# Patient Record
Sex: MALE | Race: WHITE | NOT HISPANIC OR LATINO | Employment: FULL TIME | URBAN - METROPOLITAN AREA
[De-identification: names, ages, dates, MRNs, and addresses within clinical notes are randomized per-mention and may not be internally consistent; named-entity substitution may affect disease eponyms.]

---

## 2018-01-10 NOTE — MISCELLANEOUS
Message   Recorded as Task   Date: 06/30/2016 09:50 AM, Created By: VITO RUBIO  [facility]   Task Name: Follow Up   Assigned To: [facility],Team   Regarding Patient: Kimberlyn Zabala, Status: Active   CommentNhi Wharton - 30 Jun 2016 9:50 AM     TASK CREATED    Routine post op call placed to patient  Excision sebaceous cyst right flank, left flank; right and left lower abdomen done on 6/29/16  Patient answered and had no questions or concerns  Verified post op appointment for 7/12/16 at 9:15am at the [facility] office  Patient verbalized understanding  Path pending  [facility] - 08 Jul 2016 4:13 PM     TASK EDITED  Called patient with results  Results showed it was negative for malignancy and benign skin and adipose tissue  Explained to patient it was negative for cancer and just confirmed it was a lipoma  Patient verbalized understanding and was happy with the results and appreciated the phone call  Verified post op appointment on 7/12/16  Active Problems    1  Acute sinusitis (461 9) (J01 90)   2  Dyspnea, unspecified type (786 09) (R06 00)   3  Lipoma (214 9) (D17 9)   4  Sebaceous cyst (706 2) (L72 3)    Current Meds   1  ZyrTEC Allergy CAPS; Therapy: (Recorded:15Jun2016) to Recorded    Allergies    1  No Known Drug Allergies    2   Seasonal    Signatures   Electronically signed by : Leilani Auguste, ; Jul 8 2016  4:14PM EST                       (Author)

## 2018-01-15 NOTE — PROGRESS NOTES
Assessment    1  Encounter for preventive health examination (V70 0) (Z00 00)   2  Lipoma (214 9) (D17 9)   3  Dyspnea, unspecified type (786 09) (R06 00)    Plan  Dyspnea, unspecified type, Lipoma    · * XR CHEST PA & LATERAL; Status:Active; Requested XZB:47IMH2179; Health Maintenance    · Follow-up visit in 1 year Evaluation and Treatment  Follow-up  Status: Hold For -  Scheduling  Requested for: 12YBG0065  Lipoma    · (1) CBC/PLT/DIFF; Status:Active; Requested RWS:[id number];    · (1) COMPREHENSIVE METABOLIC PANEL; Status:Active; Requested RQE:[id number];    · (1) HEMOGLOBIN A1C; Status:Active; Requested XYS:[id number];    · (1) LIPID PANEL FASTING W DIRECT LDL REFLEX; Status:Active; Requested  SZB:[id number];    · (1) T4, FREE; Status:Active; Requested LESLIE:59QHS8808;    · (1) TSH; Status:Active; Requested OIX:29BCC7198;    · (1) VITAMIN D 25-HYDROXY; Status:Active; Requested PAX:[id number];    · 1 - Fam Castellon MD  (General Surgery) Physician Referral  Consult  Status: Hold For  - Scheduling,Retrospective By Protocol Authorization  Requested for: 19OWB1139  Care Summary provided  : Yes    Discussion/Summary  Impression: health maintenance visit  Currently, he eats an adequate diet and has an adequate exercise regimen  Testicular cancer screening: the risks and benefits of testicular cancer screening were discussed  Screening lab work includes glucose, lipid profile and 25-hydroxyvitamin D  The risks and benefits of immunizations were discussed  He was advised to be evaluated by a dentist  Advice and education were given regarding nutrition, aerobic exercise, weight bearing exercise, weight loss, reproductive health, sunscreen use, self skin examination and seat belt use  Patient discussion: discussed with the patient  Rec  labs and cxray and f-up with General surgery for lipoma/lump under skin in right abdomen and b/l mid back   Monitor for increasing dyspnea with strenuous activity and call office if worse or not better  The patient was counseled regarding  Chief Complaint  Pt is here for new patient physical  PT states he has a lump on his right side of his abdomen also on the right side of his back Pt states its a little sore  PT stated it happened 3 months ago  PT also stated he has 2 other lumps on opposite side but it been there for a year  History of Present Illness  HPI: Pt is here for new patient physical  PT states he has a lump on his right side of his abdomen also on the right side of his back Pt states its a little sore  PT stated it happened 3 months ago  PT also stated he has 2 other lumps on opposite side but it been there for a year  Pt  has 3 sons, is  but has a girlfriend, he is a  also  Mother  from HIV complications and also has younger brother with HIV  Pt has 7 brothers  Father with cirrhosis and other medical conditions  Pt  also complains of some dyspnea only associated with strenuous activity like hiking  He was a 17 year smoker but quit  Pt  does [employer] but no cp or sob with this activity  Review of Systems    Constitutional: No fever or chills, feels well, no tiredness, no recent weight gain or weight loss  Eyes: No complaints of eye pain, no red eyes, no discharge from eyes, no itchy eyes  ENT: no complaints of earache, no hearing loss, no nosebleeds, no nasal discharge, no sore throat, no hoarseness  Cardiovascular: No complaints of slow heart rate, no fast heart rate, no chest pain, no palpitations, no leg claudication, no lower extremity  Respiratory: as noted in HPI  Gastrointestinal: No complaints of abdominal pain, no constipation, no nausea or vomiting, no diarrhea or bloody stools  Genitourinary: No complaints of dysuria, no incontinence, no hesitancy, no nocturia, no genital lesion, no testicular pain  Musculoskeletal: No complaints of arthralgia, no myalgias, no joint swelling or stiffness, no limb pain or swelling  Integumentary: as noted in HPI  Neurological: No compliants of headache, no confusion, no convulsions, no numbness or tingling, no dizziness or fainting, no limb weakness, no difficulty walking  Psychiatric: Is not suicidal, no sleep disturbances, no anxiety or depression, no change in personality, no emotional problems  Endocrine: No complaints of proptosis, no hot flashes, no muscle weakness, no erectile dysfunction, no deepening of the voice, no feelings of weakness  Hematologic/Lymphatic: No complaints of swollen glands, no swollen glands in the neck, does not bleed easily, no easy bruising  Active Problems    1  Acute sinusitis (461 9) (J01 90)    Family History  Mother    · Family history of   Father    · Family history of     Social History    · Alcohol use (V49 89) (Z78 9)   · Quit smoking (V15 82) (P67 124)    Current Meds   1  Amoxicillin-Pot Clavulanate 875-125 MG Oral Tablet; take 1 tab po BID x 7 days; Therapy: 15ACB4993 to (Last Rx:72Kze9876)  Requested for: 41Jbf1248 Ordered   2  [facility] Allergy CAPS; Therapy: (Recorded:2016) to Recorded    Allergies    1  No Known Drug Allergies    2  Seasonal    Vitals   Recorded: 49IVP8751 81:18BS   Systolic 481   Diastolic 80   Height 5 ft 8 5 in   Weight 221 lb 0 8 oz   BMI Calculated 33 12   BSA Calculated 2 14     Physical Exam    Constitutional   General appearance: No acute distress, well appearing and well nourished  Eyes   Conjunctiva and lids: No erythema, swelling or discharge  Pupils and irises: Equal, round, reactive to light  Ophthalmoscopic examination: Normal fundi and optic discs  Ears, Nose, Mouth, and Throat   External inspection of ears and nose: Normal     Otoscopic examination: Tympanic membranes translucent with normal light reflex  Canals patent without erythema  Hearing: Normal     Nasal mucosa, septum, and turbinates: Normal without edema or erythema      Lips, teeth, and gums: Normal, good dentition  Oropharynx: Normal with no erythema, edema, exudate or lesions  Neck   Neck: Supple, symmetric, trachea midline, no masses  Thyroid: Normal, no thyromegaly  Pulmonary   Respiratory effort: No increased work of breathing or signs of respiratory distress  Percussion of chest: Normal     Palpation of chest: Normal     Auscultation of lungs: Clear to auscultation  Cardiovascular   Palpation of heart: Normal PMI, no thrills  Auscultation of heart: Normal rate and rhythm, normal S1 and S2, no murmurs  Carotid pulses: 2+ bilaterally  Abdominal aorta: Normal     Femoral pulses: 2+ bilaterally  Pedal pulses: 2+ bilaterally  Examination of extremities for edema and/or varicosities: Normal     Chest   Breasts: Normal, no dimpling or skin changes appreciated  Palpation of breasts and axillae: Normal, no masses palpated  Chest: Normal     Abdomen   Abdomen: Non-tender, no masses  Liver and spleen: No hepatomegaly or splenomegaly  Examination for hernias: No hernias appreciated  Genitourinary   Scrotal contents: Normal testes, no masses  Penis: Normal, no lesions  Lymphatic   Palpation of lymph nodes in neck: No lymphadenopathy  Palpation of lymph nodes in axillae: No lymphadenopathy  Palpation of lymph nodes in groin: No lymphadenopathy  Palpation of lymph nodes in other areas: No lymphadenopathy  Musculoskeletal   Gait and station: Normal     Inspection/palpation of digits and nails: Normal without clubbing or cyanosis  Inspection/palpation of joints, bones, and muscles: Normal     Range of motion: Normal     Stability: Normal     Muscle strength/tone: Normal     Skin   Skin and subcutaneous tissue: Abnormal   right back right abdominal area and left back area lump/possible lipoma  Palpation of skin and subcutaneous tissue: Normal turgor  Neurologic   Cranial nerves: Cranial nerves 2-12 intact  Reflexes: 2+ and symmetric      Sensation: No sensory loss  Psychiatric   Judgment and insight: Normal     Orientation to person, place and time: Normal     Recent and remote memory: Intact      Mood and affect: Normal        Signatures   Electronically signed by : Ignacio Brambila DO; Tyrese 15 2016  3:00PM EST                       (Author)

## 2018-01-17 NOTE — MISCELLANEOUS
Message   Recorded as Task   Date: 06/15/2016 02:49 PM, Created By: System   Task Name: Schedule Appointment   Assigned To: [facility],Team   Regarding Patient: Verner Boyer, Status: In Progress   Comment:    System - 15 Tyrese 2016 2:49 PM        Shoshana Esteban - 15 Tyrese 2016 2:50 PM     TASK REASSIGNED: Previously Assigned To Jennifer Bledsoe  Please call patient to schedule an appointment within 48 hours on their home phone [phone]  The best time to reach the patient is in the day  Patient prefers appointment to be  or first available  LIPMOA ON ABD    Last Mammogram date:[  ]    After this appointment is scheduled please reply back to [  ] team    Alexi Munoz - 15 Tyrese 2016 3:38 PM     TASK IN PROGRESS   Kaylen Stafford - 16 Jun 2016 9:41 AM     TASK EDITED  lvm [phone]   Jodi Ray - 16 Jun 2016 11:13 AM     TASK EDITED  PT is scheduled for 6/24 @ 12:30pm with Dr Azar Sorto  pt needs a referral for this appointment  Leonie Hollingsworth - 16 Jun 2016 11:24 AM     TASK IN PROGRESS   Jodi Ray - 16 Jun 2016 11:35 AM     TASK EDITED   Jordyn Gaffney - 16 Jun 2016 11:37 AM     TASK REASSIGNED: Previously Assigned To CENTRAL SCHEDULING,SPECIALIST APPT TM  6/16/16 INSURANCE REFERRAL IS COMPLETED & PATIENT HAS BEEN NOTIFIED/MO2        Active Problems    1  Acute sinusitis (461 9) (J01 90)   2  Dyspnea, unspecified type (786 09) (R06 00)   3  Lipoma (214 9) (D17 9)    Current Meds   1  Amoxicillin-Pot Clavulanate 875-125 MG Oral Tablet (Augmentin); take 1 tab po BID x 7   days; Therapy: 84KMF4186 to (Last Rx:[id number])  Requested for: 44Vpb6597 Ordered   2  ZyrTEC Allergy CAPS; Therapy: (Recorded:15Jun2016) to Recorded    Allergies    1  No Known Drug Allergies    2   Seasonal    Signatures   Electronically signed by : Miracle Holden, ; Jun 16 2016 11:52AM EST                       (Author)

## 2018-01-18 NOTE — MISCELLANEOUS
Message  Return to work or school:   Katarina Jay is under my professional care   He was seen in my office on 02/12/2016             Signatures   Electronically signed by : GREYSON Mendez ; Feb 12 2016  2:15PM EST                       (Author)

## 2018-08-17 ENCOUNTER — OFFICE VISIT (OUTPATIENT)
Dept: FAMILY MEDICINE CLINIC | Facility: CLINIC | Age: 38
End: 2018-08-17
Payer: COMMERCIAL

## 2018-08-17 VITALS
SYSTOLIC BLOOD PRESSURE: 110 MMHG | WEIGHT: 219.4 LBS | BODY MASS INDEX: 32.5 KG/M2 | HEIGHT: 69 IN | DIASTOLIC BLOOD PRESSURE: 70 MMHG

## 2018-08-17 DIAGNOSIS — Z00.00 HEALTH CARE MAINTENANCE: Primary | ICD-10-CM

## 2018-08-17 DIAGNOSIS — Z13.220 SCREENING FOR HYPERLIPIDEMIA: ICD-10-CM

## 2018-08-17 DIAGNOSIS — E66.9 OBESITY (BMI 30-39.9): ICD-10-CM

## 2018-08-17 DIAGNOSIS — B35.4 TINEA CORPORIS: ICD-10-CM

## 2018-08-17 PROCEDURE — 99396 PREV VISIT EST AGE 40-64: CPT | Performed by: FAMILY MEDICINE

## 2018-08-17 RX ORDER — CLOTRIMAZOLE AND BETAMETHASONE DIPROPIONATE 10; .64 MG/G; MG/G
CREAM TOPICAL 2 TIMES DAILY
Qty: 30 G | Refills: 0 | Status: SHIPPED | OUTPATIENT
Start: 2018-08-17

## 2018-08-17 NOTE — PROGRESS NOTES
Assessment/Plan:  Chief Complaint   Patient presents with    Physical Exam     discuss tdap     Patient Instructions   Here for general PE and use Lotrisone as directed for rash and call if not better  Check complete labs and continue with diet and exercise to help lose weight  No problem-specific Assessment & Plan notes found for this encounter  Diagnoses and all orders for this visit:    Health care maintenance  -     Comprehensive metabolic panel; Future  -     CBC and differential; Future  -     Lipid Panel with Direct LDL reflex; Future    Tinea corporis  -     clotrimazole-betamethasone (LOTRISONE) 1-0 05 % cream; Apply topically 2 (two) times a day    Obesity (BMI 30-39  9)    Screening for hyperlipidemia  -     Comprehensive metabolic panel; Future  -     Lipid Panel with Direct LDL reflex; Future          Subjective:      Patient ID: Mee Goel is a 34 y o  male  Here for general PE and is  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  No cp or sob, or ha  Feels well  Stress stable  Quit tobacco 5 years ago  The following portions of the patient's history were reviewed and updated as appropriate: allergies, current medications, past family history, past medical history, past social history, past surgical history and problem list     Review of Systems   Constitutional:        Obesity     HENT: Negative  Eyes: Negative  Respiratory: Negative  Cardiovascular: Negative  Gastrointestinal: Negative  Endocrine: Negative  Genitourinary: Negative  Musculoskeletal: Negative  Skin: Negative  Rash on left hand and left foot    Allergic/Immunologic: Negative  Neurological: Negative  Hematological: Negative  Psychiatric/Behavioral: Negative            Objective:      /70   Ht 5' 8 5" (1 74 m)   Wt 99 5 kg (219 lb 6 4 oz)   BMI 32 87 kg/m²          Physical Exam   Constitutional: He is oriented to person, place, and time  He appears well-developed and well-nourished  obesity   HENT:   Head: Normocephalic and atraumatic  Right Ear: External ear normal    Left Ear: External ear normal    Nose: Nose normal    Mouth/Throat: Oropharynx is clear and moist    Eyes: Conjunctivae and EOM are normal  Pupils are equal, round, and reactive to light  Neck: Normal range of motion  Neck supple  Cardiovascular: Normal rate, regular rhythm, normal heart sounds and intact distal pulses  Pulmonary/Chest: Effort normal and breath sounds normal    Abdominal: Soft  Bowel sounds are normal    Genitourinary: Penis normal    Musculoskeletal: Normal range of motion  Neurological: He is alert and oriented to person, place, and time  He has normal reflexes  Skin: Skin is warm and dry  Rash left hand and left foot tinea corporis likely  Psychiatric: He has a normal mood and affect   His behavior is normal

## 2018-08-17 NOTE — PATIENT INSTRUCTIONS
Here for general PE and use Lotrisone as directed for rash and call if not better  Check complete labs and continue with diet and exercise to help lose weight

## 2018-12-20 ENCOUNTER — OFFICE VISIT (OUTPATIENT)
Dept: FAMILY MEDICINE CLINIC | Facility: CLINIC | Age: 38
End: 2018-12-20
Payer: COMMERCIAL

## 2018-12-20 VITALS
WEIGHT: 236.2 LBS | HEIGHT: 68 IN | DIASTOLIC BLOOD PRESSURE: 82 MMHG | BODY MASS INDEX: 35.8 KG/M2 | SYSTOLIC BLOOD PRESSURE: 126 MMHG

## 2018-12-20 DIAGNOSIS — E66.9 OBESITY (BMI 30-39.9): ICD-10-CM

## 2018-12-20 DIAGNOSIS — B35.4 TINEA CORPORIS: Primary | ICD-10-CM

## 2018-12-20 DIAGNOSIS — Z13.220 SCREENING FOR HYPERLIPIDEMIA: ICD-10-CM

## 2018-12-20 DIAGNOSIS — R21 RASH: ICD-10-CM

## 2018-12-20 PROCEDURE — 1036F TOBACCO NON-USER: CPT | Performed by: FAMILY MEDICINE

## 2018-12-20 PROCEDURE — 3008F BODY MASS INDEX DOCD: CPT | Performed by: FAMILY MEDICINE

## 2018-12-20 PROCEDURE — 99214 OFFICE O/P EST MOD 30 MIN: CPT | Performed by: FAMILY MEDICINE

## 2018-12-20 RX ORDER — CLOTRIMAZOLE AND BETAMETHASONE DIPROPIONATE 10; .64 MG/G; MG/G
CREAM TOPICAL 2 TIMES DAILY
Qty: 45 G | Refills: 1 | Status: SHIPPED | OUTPATIENT
Start: 2018-12-20

## 2018-12-20 RX ORDER — PREDNISONE 10 MG/1
TABLET ORAL
Qty: 21 TABLET | Refills: 0 | Status: SHIPPED | OUTPATIENT
Start: 2018-12-20 | End: 2020-09-03

## 2018-12-20 NOTE — PROGRESS NOTES
Assessment/Plan:  Chief Complaint   Patient presents with    Rash     L abdomen and L thigh is red, scaly, and extremely itchy  Using vaseline daily and cortisone 10      Patient Instructions   Start lotrisone as directed for rash left thigh and also left abdomen  Start prednisone 10 mg 6-5-4-3-2-1 and also use Zyrtec 10 mg daily prn rash  Wash hands  Call if any problems  No problem-specific Assessment & Plan notes found for this encounter  Diagnoses and all orders for this visit:    Tinea corporis  -     clotrimazole-betamethasone (LOTRISONE) 1-0 05 % cream; Apply topically 2 (two) times a day  -     Comprehensive metabolic panel; Future  -     CBC and differential; Future    Rash  -     clotrimazole-betamethasone (LOTRISONE) 1-0 05 % cream; Apply topically 2 (two) times a day  -     predniSONE 10 mg tablet; Take 60 mg po day#1, 50 mg po day#2, 40 mg po day#3, 30 mg po day#4, 20 mg po day#5m and 10 mg po day#6  -     Comprehensive metabolic panel; Future  -     CBC and differential; Future    Obesity (BMI 30-39 9)  -     Comprehensive metabolic panel; Future  -     CBC and differential; Future    Screening for hyperlipidemia  -     Comprehensive metabolic panel; Future  -     Lipid Panel with Direct LDL reflex; Future          Subjective:      Patient ID: Shasta Paiz is a 34 y o  male  Rash (L abdomen and L thigh is red, scaly, and extremely itchy  Using vaseline daily and cortisone 10 )   He is going on vacation soon  No other complaints  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  Also needs labs reprinted to be done  Rash         The following portions of the patient's history were reviewed and updated as appropriate: allergies, current medications, past family history, past medical history, past social history, past surgical history and problem list     Review of Systems   Constitutional: Negative  HENT: Negative  Eyes: Negative  Respiratory: Negative      Cardiovascular: Negative  Gastrointestinal: Negative  Endocrine: Negative  Genitourinary: Negative  Musculoskeletal: Negative  Skin: Positive for rash (left abdomen and left thigh for 2 weeks ago)  Allergic/Immunologic: Negative  Neurological: Negative  Hematological: Negative  Psychiatric/Behavioral: Negative  Objective:      /82   Ht 5' 8" (1 727 m)   Wt 107 kg (236 lb 3 2 oz)   BMI 35 91 kg/m²          Physical Exam   Constitutional: He is oriented to person, place, and time  He appears well-developed and well-nourished  obesity   HENT:   Head: Normocephalic and atraumatic  Right Ear: External ear normal    Left Ear: External ear normal    Nose: Nose normal    Mouth/Throat: Oropharynx is clear and moist    Eyes: Pupils are equal, round, and reactive to light  Conjunctivae and EOM are normal    Neck: Normal range of motion  Neck supple  Cardiovascular: Normal rate, regular rhythm, normal heart sounds and intact distal pulses  Pulmonary/Chest: Effort normal and breath sounds normal    Musculoskeletal: Normal range of motion  Neurological: He is alert and oriented to person, place, and time  He has normal reflexes  Skin: Skin is warm and dry    ;eft abdomen and left thigh rash   Psychiatric: He has a normal mood and affect   His behavior is normal

## 2018-12-20 NOTE — PATIENT INSTRUCTIONS
Start lotrisone as directed for rash left thigh and also left abdomen  Start prednisone 10 mg 6-5-4-3-2-1 and also use Zyrtec 10 mg daily prn rash  Wash hands  Call if any problems

## 2020-03-20 ENCOUNTER — OFFICE VISIT (OUTPATIENT)
Dept: FAMILY MEDICINE CLINIC | Facility: CLINIC | Age: 40
End: 2020-03-20
Payer: COMMERCIAL

## 2020-03-20 VITALS
OXYGEN SATURATION: 97 % | TEMPERATURE: 97.5 F | BODY MASS INDEX: 34.93 KG/M2 | SYSTOLIC BLOOD PRESSURE: 136 MMHG | HEART RATE: 65 BPM | WEIGHT: 235.8 LBS | RESPIRATION RATE: 17 BRPM | HEIGHT: 69 IN | DIASTOLIC BLOOD PRESSURE: 84 MMHG

## 2020-03-20 DIAGNOSIS — E66.9 OBESITY (BMI 30-39.9): ICD-10-CM

## 2020-03-20 DIAGNOSIS — Z13.220 SCREENING FOR HYPERLIPIDEMIA: ICD-10-CM

## 2020-03-20 DIAGNOSIS — Z00.00 HEALTH CARE MAINTENANCE: Primary | ICD-10-CM

## 2020-03-20 PROCEDURE — 99396 PREV VISIT EST AGE 40-64: CPT | Performed by: FAMILY MEDICINE

## 2020-03-20 NOTE — PROGRESS NOTES
BMI Counseling: Body mass index is 35 13 kg/m²  The BMI is above normal  Nutrition recommendations include reducing portion sizes, decreasing overall calorie intake, 3-5 servings of fruits/vegetables daily, reducing fast food intake, consuming healthier snacks and reducing intake of cholesterol  Exercise recommendations include exercising 3-5 times per week

## 2020-03-20 NOTE — PATIENT INSTRUCTIONS
Here for general PE and will need to have labs done and lose weight  Recheck yearly for general PE  Preventive medicine discussed  F-up with dentist as directed  Doing Kendrick Mchugh Do

## 2020-03-20 NOTE — PROGRESS NOTES
Assessment/Plan:  Chief Complaint   Patient presents with    Physical Exam     Annual  Pt c/o on going dizziness and nausea  Pt also has trouble concentrating  Patient Instructions   Here for general PE and will need to have labs done and lose weight  Recheck yearly for general PE  No problem-specific Assessment & Plan notes found for this encounter  Diagnoses and all orders for this visit:    Health care maintenance  -     Comprehensive metabolic panel; Future  -     CBC and differential; Future  -     Lipid Panel with Direct LDL reflex; Future  -     TSH, 3rd generation; Future  -     T4, free    Obesity (BMI 30-39 9)  -     Comprehensive metabolic panel; Future  -     CBC and differential; Future  -     Lipid Panel with Direct LDL reflex; Future  -     TSH, 3rd generation; Future  -     T4, free    Screening for hyperlipidemia  -     Comprehensive metabolic panel; Future  -     CBC and differential; Future  -     Lipid Panel with Direct LDL reflex; Future  -     TSH, 3rd generation; Future  -     T4, free          Subjective:      Patient ID: Abdias Melvin is a 37 y o  male  Physical Exam (Annual  Pt c/o on going dizziness and nausea  Pt also has trouble concentrating  )      The following portions of the patient's history were reviewed and updated as appropriate: allergies, current medications, past family history, past medical history, past social history, past surgical history and problem list     Review of Systems   Constitutional:        Obesity   HENT: Negative  Eyes: Negative  Respiratory: Negative  Cardiovascular: Negative  Gastrointestinal: Negative  Endocrine: Negative  Genitourinary: Negative  Musculoskeletal: Negative  Skin: Negative  Allergic/Immunologic: Negative  Neurological: Negative  Hematological: Negative  Psychiatric/Behavioral: Negative            Objective:      /84 (BP Location: Left arm, Patient Position: Sitting, Cuff Size: Large)   Pulse 65   Temp 97 5 °F (36 4 °C) (Temporal)   Resp 17   Ht 5' 8 7" (1 745 m)   Wt 107 kg (235 lb 12 8 oz)   SpO2 97%   BMI 35 13 kg/m²          Physical Exam   Constitutional: He is oriented to person, place, and time  He appears well-developed and well-nourished  obesity   HENT:   Head: Normocephalic and atraumatic  Right Ear: External ear normal    Left Ear: External ear normal    Nose: Nose normal    Mouth/Throat: Oropharynx is clear and moist    Eyes: Pupils are equal, round, and reactive to light  Conjunctivae and EOM are normal    Neck: Normal range of motion  Neck supple  Cardiovascular: Normal rate, regular rhythm, normal heart sounds and intact distal pulses  Pulmonary/Chest: Effort normal and breath sounds normal    Abdominal: Soft  Bowel sounds are normal    Musculoskeletal: Normal range of motion  Neurological: He is alert and oriented to person, place, and time  He has normal reflexes  Skin: Skin is warm and dry  Psychiatric: He has a normal mood and affect   His behavior is normal

## 2020-03-24 ENCOUNTER — APPOINTMENT (OUTPATIENT)
Dept: LAB | Facility: CLINIC | Age: 40
End: 2020-03-24
Payer: COMMERCIAL

## 2020-03-24 DIAGNOSIS — E66.9 OBESITY (BMI 30-39.9): ICD-10-CM

## 2020-03-24 DIAGNOSIS — Z13.220 SCREENING FOR HYPERLIPIDEMIA: ICD-10-CM

## 2020-03-24 DIAGNOSIS — Z00.00 HEALTH CARE MAINTENANCE: ICD-10-CM

## 2020-03-24 LAB
ALBUMIN SERPL BCP-MCNC: 3.9 G/DL (ref 3.5–5)
ALP SERPL-CCNC: 83 U/L (ref 46–116)
ALT SERPL W P-5'-P-CCNC: 38 U/L (ref 12–78)
ANION GAP SERPL CALCULATED.3IONS-SCNC: 4 MMOL/L (ref 4–13)
AST SERPL W P-5'-P-CCNC: 27 U/L (ref 5–45)
BASOPHILS # BLD AUTO: 0.04 THOUSANDS/ΜL (ref 0–0.1)
BASOPHILS NFR BLD AUTO: 1 % (ref 0–1)
BILIRUB SERPL-MCNC: 0.71 MG/DL (ref 0.2–1)
BUN SERPL-MCNC: 12 MG/DL (ref 5–25)
CALCIUM SERPL-MCNC: 9.3 MG/DL (ref 8.3–10.1)
CHLORIDE SERPL-SCNC: 110 MMOL/L (ref 100–108)
CHOLEST SERPL-MCNC: 148 MG/DL (ref 50–200)
CO2 SERPL-SCNC: 26 MMOL/L (ref 21–32)
CREAT SERPL-MCNC: 0.93 MG/DL (ref 0.6–1.3)
EOSINOPHIL # BLD AUTO: 0.09 THOUSAND/ΜL (ref 0–0.61)
EOSINOPHIL NFR BLD AUTO: 2 % (ref 0–6)
ERYTHROCYTE [DISTWIDTH] IN BLOOD BY AUTOMATED COUNT: 18.7 % (ref 11.6–15.1)
GFR SERPL CREATININE-BSD FRML MDRD: 102 ML/MIN/1.73SQ M
GLUCOSE P FAST SERPL-MCNC: 100 MG/DL (ref 65–99)
HCT VFR BLD AUTO: 38.9 % (ref 36.5–49.3)
HDLC SERPL-MCNC: 40 MG/DL
HGB BLD-MCNC: 11 G/DL (ref 12–17)
IMM GRANULOCYTES # BLD AUTO: 0.01 THOUSAND/UL (ref 0–0.2)
IMM GRANULOCYTES NFR BLD AUTO: 0 % (ref 0–2)
LDLC SERPL CALC-MCNC: 76 MG/DL (ref 0–100)
LYMPHOCYTES # BLD AUTO: 1.41 THOUSANDS/ΜL (ref 0.6–4.47)
LYMPHOCYTES NFR BLD AUTO: 28 % (ref 14–44)
MCH RBC QN AUTO: 19.3 PG (ref 26.8–34.3)
MCHC RBC AUTO-ENTMCNC: 28.3 G/DL (ref 31.4–37.4)
MCV RBC AUTO: 68 FL (ref 82–98)
MONOCYTES # BLD AUTO: 0.42 THOUSAND/ΜL (ref 0.17–1.22)
MONOCYTES NFR BLD AUTO: 8 % (ref 4–12)
NEUTROPHILS # BLD AUTO: 3.05 THOUSANDS/ΜL (ref 1.85–7.62)
NEUTS SEG NFR BLD AUTO: 61 % (ref 43–75)
NRBC BLD AUTO-RTO: 0 /100 WBCS
PLATELET # BLD AUTO: 176 THOUSANDS/UL (ref 149–390)
POTASSIUM SERPL-SCNC: 4.6 MMOL/L (ref 3.5–5.3)
PROT SERPL-MCNC: 7.2 G/DL (ref 6.4–8.2)
RBC # BLD AUTO: 5.7 MILLION/UL (ref 3.88–5.62)
SODIUM SERPL-SCNC: 140 MMOL/L (ref 136–145)
T4 FREE SERPL-MCNC: 1.04 NG/DL (ref 0.76–1.46)
TRIGL SERPL-MCNC: 160 MG/DL
TSH SERPL DL<=0.05 MIU/L-ACNC: 1.5 UIU/ML (ref 0.36–3.74)
WBC # BLD AUTO: 5.02 THOUSAND/UL (ref 4.31–10.16)

## 2020-03-24 PROCEDURE — 84443 ASSAY THYROID STIM HORMONE: CPT

## 2020-03-24 PROCEDURE — 36415 COLL VENOUS BLD VENIPUNCTURE: CPT

## 2020-03-24 PROCEDURE — 80053 COMPREHEN METABOLIC PANEL: CPT

## 2020-03-24 PROCEDURE — 80061 LIPID PANEL: CPT

## 2020-03-24 PROCEDURE — 85025 COMPLETE CBC W/AUTO DIFF WBC: CPT

## 2020-03-24 PROCEDURE — 84439 ASSAY OF FREE THYROXINE: CPT | Performed by: FAMILY MEDICINE

## 2020-03-25 ENCOUNTER — TELEPHONE (OUTPATIENT)
Dept: SURGICAL ONCOLOGY | Facility: CLINIC | Age: 40
End: 2020-03-25

## 2020-03-25 DIAGNOSIS — D64.9 ANEMIA, UNSPECIFIED TYPE: Primary | ICD-10-CM

## 2020-03-31 ENCOUNTER — TELEPHONE (OUTPATIENT)
Dept: HEMATOLOGY ONCOLOGY | Facility: CLINIC | Age: 40
End: 2020-03-31

## 2020-03-31 NOTE — TELEPHONE ENCOUNTER
Called [phone] spoke with patients confirmed appointment for 4/1/20, [place] screening questions answered "No" to all

## 2020-04-01 ENCOUNTER — TELEPHONE (OUTPATIENT)
Dept: GASTROENTEROLOGY | Facility: AMBULARY SURGERY CENTER | Age: 40
End: 2020-04-01

## 2020-04-01 ENCOUNTER — CONSULT (OUTPATIENT)
Dept: HEMATOLOGY ONCOLOGY | Facility: CLINIC | Age: 40
End: 2020-04-01
Payer: COMMERCIAL

## 2020-04-01 VITALS
WEIGHT: 235 LBS | DIASTOLIC BLOOD PRESSURE: 72 MMHG | RESPIRATION RATE: 16 BRPM | OXYGEN SATURATION: 98 % | TEMPERATURE: 97.6 F | HEART RATE: 75 BPM | BODY MASS INDEX: 34.8 KG/M2 | SYSTOLIC BLOOD PRESSURE: 136 MMHG | HEIGHT: 69 IN

## 2020-04-01 DIAGNOSIS — K62.5 BRBPR (BRIGHT RED BLOOD PER RECTUM): Primary | ICD-10-CM

## 2020-04-01 DIAGNOSIS — K21.9 GASTROESOPHAGEAL REFLUX DISEASE WITHOUT ESOPHAGITIS: ICD-10-CM

## 2020-04-01 DIAGNOSIS — Z87.19 HISTORY OF HEMORRHOIDS: ICD-10-CM

## 2020-04-01 DIAGNOSIS — D64.9 ANEMIA, UNSPECIFIED TYPE: ICD-10-CM

## 2020-04-01 DIAGNOSIS — R71.8 MICROCYTOSIS: ICD-10-CM

## 2020-04-01 PROCEDURE — 99244 OFF/OP CNSLTJ NEW/EST MOD 40: CPT | Performed by: PHYSICIAN ASSISTANT

## 2020-04-03 ENCOUNTER — TELEMEDICINE (OUTPATIENT)
Dept: GASTROENTEROLOGY | Facility: MEDICAL CENTER | Age: 40
End: 2020-04-03
Payer: COMMERCIAL

## 2020-04-03 ENCOUNTER — TELEPHONE (OUTPATIENT)
Dept: GASTROENTEROLOGY | Facility: MEDICAL CENTER | Age: 40
End: 2020-04-03

## 2020-04-03 DIAGNOSIS — D64.9 ANEMIA, UNSPECIFIED TYPE: ICD-10-CM

## 2020-04-03 DIAGNOSIS — Z87.19 HISTORY OF HEMORRHOIDS: ICD-10-CM

## 2020-04-03 DIAGNOSIS — K62.5 BRBPR (BRIGHT RED BLOOD PER RECTUM): Primary | ICD-10-CM

## 2020-04-03 DIAGNOSIS — K21.9 GASTROESOPHAGEAL REFLUX DISEASE WITHOUT ESOPHAGITIS: ICD-10-CM

## 2020-04-03 PROCEDURE — 99202 OFFICE O/P NEW SF 15 MIN: CPT | Performed by: INTERNAL MEDICINE

## 2020-06-26 ENCOUNTER — ANESTHESIA EVENT (OUTPATIENT)
Dept: GASTROENTEROLOGY | Facility: MEDICAL CENTER | Age: 40
End: 2020-06-26

## 2020-06-29 ENCOUNTER — TRANSCRIBE ORDERS (OUTPATIENT)
Dept: GASTROENTEROLOGY | Facility: CLINIC | Age: 40
End: 2020-06-29

## 2020-06-30 ENCOUNTER — PREP FOR PROCEDURE (OUTPATIENT)
Dept: GASTROENTEROLOGY | Facility: MEDICAL CENTER | Age: 40
End: 2020-06-30

## 2020-06-30 DIAGNOSIS — Z20.822 ENCOUNTER FOR LABORATORY TESTING FOR COVID-19 VIRUS: Primary | ICD-10-CM

## 2020-07-02 DIAGNOSIS — Z20.822 ENCOUNTER FOR LABORATORY TESTING FOR COVID-19 VIRUS: ICD-10-CM

## 2020-07-02 PROCEDURE — U0003 INFECTIOUS AGENT DETECTION BY NUCLEIC ACID (DNA OR RNA); SEVERE ACUTE RESPIRATORY SYNDROME CORONAVIRUS 2 (SARS-COV-2) (CORONAVIRUS DISEASE [COVID-19]), AMPLIFIED PROBE TECHNIQUE, MAKING USE OF HIGH THROUGHPUT TECHNOLOGIES AS DESCRIBED BY CMS-2020-01-R: HCPCS

## 2020-07-06 LAB — SARS-COV-2 RNA SPEC QL NAA+PROBE: NOT DETECTED

## 2020-07-08 ENCOUNTER — TELEPHONE (OUTPATIENT)
Dept: GASTROENTEROLOGY | Facility: MEDICAL CENTER | Age: 40
End: 2020-07-08

## 2020-07-08 DIAGNOSIS — K62.5 BRBPR (BRIGHT RED BLOOD PER RECTUM): Primary | ICD-10-CM

## 2020-07-08 RX ORDER — SODIUM CHLORIDE 9 MG/ML
125 INJECTION, SOLUTION INTRAVENOUS CONTINUOUS
Status: CANCELLED | OUTPATIENT
Start: 2020-07-08

## 2020-07-08 NOTE — TELEPHONE ENCOUNTER
Patient called and rescheduled procedure to 09/03 at [facility] with Dr Stephanie Fonseca  He is aware to go have covid testing the Friday or Saturday prior to the procedure  He stated that he has all of his paperwork but need prep resent to the pharmacy on file  Message sent to provider bin

## 2020-07-09 ENCOUNTER — ANESTHESIA (OUTPATIENT)
Dept: GASTROENTEROLOGY | Facility: MEDICAL CENTER | Age: 40
End: 2020-07-09

## 2020-07-09 ENCOUNTER — HOSPITAL ENCOUNTER (OUTPATIENT)
Dept: GASTROENTEROLOGY | Facility: MEDICAL CENTER | Age: 40
Setting detail: OUTPATIENT SURGERY
Discharge: HOME | End: 2020-07-09
Attending: INTERNAL MEDICINE

## 2020-08-10 ENCOUNTER — APPOINTMENT (OUTPATIENT)
Dept: LAB | Facility: CLINIC | Age: 40
End: 2020-08-10
Payer: COMMERCIAL

## 2020-08-10 DIAGNOSIS — D64.9 ANEMIA, UNSPECIFIED TYPE: ICD-10-CM

## 2020-08-10 DIAGNOSIS — R71.8 MICROCYTOSIS: ICD-10-CM

## 2020-08-10 LAB
BASOPHILS # BLD AUTO: 0.07 THOUSANDS/ΜL (ref 0–0.1)
BASOPHILS NFR BLD AUTO: 1 % (ref 0–1)
EOSINOPHIL # BLD AUTO: 0.15 THOUSAND/ΜL (ref 0–0.61)
EOSINOPHIL NFR BLD AUTO: 3 % (ref 0–6)
ERYTHROCYTE [DISTWIDTH] IN BLOOD BY AUTOMATED COUNT: 18.9 % (ref 11.6–15.1)
HCT VFR BLD AUTO: 40 % (ref 36.5–49.3)
HGB BLD-MCNC: 11.7 G/DL (ref 12–17)
IMM GRANULOCYTES # BLD AUTO: 0 THOUSAND/UL (ref 0–0.2)
IMM GRANULOCYTES NFR BLD AUTO: 0 % (ref 0–2)
LYMPHOCYTES # BLD AUTO: 1.69 THOUSANDS/ΜL (ref 0.6–4.47)
LYMPHOCYTES NFR BLD AUTO: 33 % (ref 14–44)
MCH RBC QN AUTO: 20.7 PG (ref 26.8–34.3)
MCHC RBC AUTO-ENTMCNC: 29.3 G/DL (ref 31.4–37.4)
MCV RBC AUTO: 71 FL (ref 82–98)
MONOCYTES # BLD AUTO: 0.47 THOUSAND/ΜL (ref 0.17–1.22)
MONOCYTES NFR BLD AUTO: 9 % (ref 4–12)
NEUTROPHILS # BLD AUTO: 2.74 THOUSANDS/ΜL (ref 1.85–7.62)
NEUTS SEG NFR BLD AUTO: 54 % (ref 43–75)
NRBC BLD AUTO-RTO: 0 /100 WBCS
PLATELET # BLD AUTO: 216 THOUSANDS/UL (ref 149–390)
RBC # BLD AUTO: 5.66 MILLION/UL (ref 3.88–5.62)
VIT B12 SERPL-MCNC: 502 PG/ML (ref 100–900)
WBC # BLD AUTO: 5.12 THOUSAND/UL (ref 4.31–10.16)

## 2020-08-10 PROCEDURE — 36415 COLL VENOUS BLD VENIPUNCTURE: CPT

## 2020-08-10 PROCEDURE — 82607 VITAMIN B-12: CPT

## 2020-08-10 PROCEDURE — 85025 COMPLETE CBC W/AUTO DIFF WBC: CPT

## 2020-08-10 PROCEDURE — 83020 HEMOGLOBIN ELECTROPHORESIS: CPT

## 2020-08-10 PROCEDURE — 83010 ASSAY OF HAPTOGLOBIN QUANT: CPT

## 2020-08-11 ENCOUNTER — OFFICE VISIT (OUTPATIENT)
Dept: HEMATOLOGY ONCOLOGY | Facility: CLINIC | Age: 40
End: 2020-08-11
Payer: COMMERCIAL

## 2020-08-11 VITALS
RESPIRATION RATE: 16 BRPM | HEART RATE: 81 BPM | WEIGHT: 231 LBS | HEIGHT: 68 IN | TEMPERATURE: 98.6 F | BODY MASS INDEX: 35.01 KG/M2 | OXYGEN SATURATION: 99 % | DIASTOLIC BLOOD PRESSURE: 82 MMHG | SYSTOLIC BLOOD PRESSURE: 110 MMHG

## 2020-08-11 DIAGNOSIS — R71.8 MICROCYTOSIS: Primary | ICD-10-CM

## 2020-08-11 DIAGNOSIS — D64.9 ANEMIA, UNSPECIFIED TYPE: ICD-10-CM

## 2020-08-11 LAB — HAPTOGLOB SERPL-MCNC: 135 MG/DL (ref 23–355)

## 2020-08-11 PROCEDURE — 99213 OFFICE O/P EST LOW 20 MIN: CPT | Performed by: PHYSICIAN ASSISTANT

## 2020-08-11 PROCEDURE — 3008F BODY MASS INDEX DOCD: CPT | Performed by: PHYSICIAN ASSISTANT

## 2020-08-11 PROCEDURE — 1036F TOBACCO NON-USER: CPT | Performed by: PHYSICIAN ASSISTANT

## 2020-08-11 RX ORDER — QUINIDINE GLUCONATE 324 MG
27 TABLET, EXTENDED RELEASE ORAL DAILY
COMMUNITY

## 2020-08-11 NOTE — PROGRESS NOTES
Hematology/Oncology Outpatient Follow-up  Marlee Benitez 41 y o  male 1980 [id number]    Date:  8/11/2020      Assessment and Plan:  2  Anemia, unspecified type, 1  Microcytosis  53-year-old male presents for follow-up regarding history of iron deficiency anemia secondary to bleeding hemorrhoids  He has a EGD and colonoscopy could scheduled with GI in September 2020  He states he feels much better with taking oral iron  He is advised to continue  Repeat labs and follow-up in 6 months     - CBC and differential; Future  - Iron Panel (Includes Ferritin, Iron Sat%, Iron, and TIBC); Future    HPI:  53-year-old male presents for follow up regarding anemia      He was seen in consultation in April 2020      10 years ago he had colonoscopy he was having blood in the stool, bright red blood in the toilet      Patient was then found to have hemorrhoids  He was treated with suppository  This then resolves        Blood per rectum has returned  Patient has had diarrhea  He states it is just blood when he wipes  It is not in the toilet       Denies any black stool  He does have GERD symptoms  If he were to eat spicy food 2 days in a row, he will not able to sleep at night due to severe GERD symptoms  Interval history: in the last 1 month only had one episode of bleeding hemorrhoids; dizziness has resolved with oral iron  He also is not tired during the daytime as before oral iron  ROS: Review of Systems   Constitutional: Negative for appetite change, chills, fatigue, fever and unexpected weight change  HENT: Negative for mouth sores and nosebleeds  Respiratory: Negative for cough and shortness of breath  Cardiovascular: Negative for chest pain, palpitations and leg swelling  Gastrointestinal: Negative for abdominal pain, constipation, diarrhea, nausea and vomiting  Genitourinary: Negative for difficulty urinating, dysuria and hematuria  Musculoskeletal: Negative for arthralgias     Skin: Negative  Neurological: Negative for dizziness, weakness, light-headedness, numbness and headaches  Hematological: Negative  Psychiatric/Behavioral: Negative          Past Medical History:   Diagnosis Date    Lipoma     Palpitations        Past Surgical History:   Procedure Laterality Date    APPENDECTOMY      MA EXC SKIN BENIG <0 5 CM TRUNK,ARM,LEG N/A 6/29/2016    Procedure: EXCISION SEBACEOUS CYST(X4) RIGHT FLANK, LEFT FLANK; RIGHT AND LEFT LOWER ABDOMEN;  Surgeon: Shanita Shah MD;  Location: [facility] OR;  Service: General       Social History     Socioeconomic History    Marital status:      Spouse name: None    Number of children: None    Years of education: None    Highest education level: None   Occupational History    None   Social Needs    Financial resource strain: None    Food insecurity     Worry: None     Inability: None    Transportation needs     Medical: None     Non-medical: None   Tobacco Use    Smoking status: Former Smoker    Smokeless tobacco: Never Used    Tobacco comment: 1 ppd for 12 years, quit about in 2012   Substance and Sexual Activity    Alcohol use: No    Drug use: No    Sexual activity: None   Lifestyle    Physical activity     Days per week: None     Minutes per session: None    Stress: None   Relationships    Social connections     Talks on phone: None     Gets together: None     Attends Holiness service: None     Active member of club or organization: None     Attends meetings of clubs or organizations: None     Relationship status: None    Intimate partner violence     Fear of current or ex partner: None     Emotionally abused: None     Physically abused: None     Forced sexual activity: None   Other Topics Concern    None   Social History Narrative    None       Family History   Problem Relation Age of Onset    Diabetes Father     Diabetes Cousin     Leukemia Cousin        Allergies   Allergen Reactions    Nuts     Seasonal Ic [Cholestatin]          Current Outpatient Medications:     Cetirizine HCl (ZYRTEC ALLERGY PO), Take by mouth , Disp: , Rfl:     ferrous gluconate (Iron 27) 240 (27 FE) MG tablet, Take 27 mg by mouth daily, Disp: , Rfl:     clotrimazole-betamethasone (LOTRISONE) 1-0 05 % cream, Apply topically 2 (two) times a day (Patient not taking: Reported on 3/20/2020), Disp: 30 g, Rfl: 0    clotrimazole-betamethasone (LOTRISONE) 1-0 05 % cream, Apply topically 2 (two) times a day (Patient not taking: Reported on 4/1/2020), Disp: 45 g, Rfl: 1    Multiple Vitamins-Minerals (MULTIVITAMIN PO), Take by mouth, Disp: , Rfl:     polyethylene glycol (GOLYTELY) 4000 mL solution, Take 4,000 mL by mouth once for 1 dose, Disp: 4000 mL, Rfl: 0    polyethylene glycol (GOLYTELY) 4000 mL solution, Take as directed by the office  (Patient not taking: Reported on 8/11/2020), Disp: 4000 mL, Rfl: 0    predniSONE 10 mg tablet, Take 60 mg po day#1, 50 mg po day#2, 40 mg po day#3, 30 mg po day#4, 20 mg po day#5m and 10 mg po day#6 (Patient not taking: Reported on 3/20/2020), Disp: 21 tablet, Rfl: 0      Physical Exam:  /82 (BP Location: Left arm, Patient Position: Sitting, Cuff Size: Adult)   Pulse 81   Temp 98 6 °F (37 °C) (Tympanic)   Resp 16   Ht 5' 8" (1 727 m)   Wt 105 kg (231 lb)   SpO2 99%   BMI 35 12 kg/m²     Physical Exam  Vitals signs reviewed  Constitutional:       General: He is not in acute distress  Appearance: He is well-developed  HENT:      Head: Normocephalic and atraumatic  Eyes:      General: No scleral icterus  Conjunctiva/sclera: Conjunctivae normal    Neck:      Musculoskeletal: Normal range of motion and neck supple  Cardiovascular:      Rate and Rhythm: Normal rate and regular rhythm  Heart sounds: Normal heart sounds  No murmur  Pulmonary:      Effort: Pulmonary effort is normal  No respiratory distress  Breath sounds: Normal breath sounds     Abdominal:      Palpations: Abdomen is soft  Tenderness: There is no abdominal tenderness  Musculoskeletal: Normal range of motion  General: No tenderness  Right lower leg: No edema  Left lower leg: No edema  Lymphadenopathy:      Cervical: No cervical adenopathy  Skin:     General: Skin is warm and dry  Neurological:      Mental Status: He is alert and oriented to person, place, and time  Cranial Nerves: No cranial nerve deficit  Labs:  Lab Results   Component Value Date    WBC 5 12 08/10/2020    HGB 11 7 (L) 08/10/2020    HCT 40 0 08/10/2020    MCV 71 (L) 08/10/2020     08/10/2020     Lab Results   Component Value Date    K 4 6 03/24/2020     (H) 03/24/2020    CO2 26 03/24/2020    BUN 12 03/24/2020    CREATININE 0 93 03/24/2020    GLUF 100 (H) 03/24/2020    CALCIUM 9 3 03/24/2020    AST 27 03/24/2020    ALT 38 03/24/2020    ALKPHOS 83 03/24/2020    EGFR 102 03/24/2020       Patient voiced understanding and agreement in the above discussion  Aware to contact our office with questions/symptoms in the interim  This note has been generated by voice recognition software system  Therefore, there may be spelling, grammar, and or syntax errors  Please contact if questions arise

## 2020-08-12 LAB
DEPRECATED HGB OTHER BLD-IMP: 0 %
HGB A MFR BLD: 1.8 % (ref 1.8–3.2)
HGB A MFR BLD: 98.2 % (ref 96.4–98.8)
HGB C MFR BLD: 0 %
HGB F MFR BLD: 0 % (ref 0–2)
HGB FRACT BLD-IMP: NORMAL
HGB S BLD QL SOLY: NEGATIVE
HGB S MFR BLD: 0 %

## 2020-08-24 ENCOUNTER — TELEPHONE (OUTPATIENT)
Dept: GASTROENTEROLOGY | Facility: MEDICAL CENTER | Age: 40
End: 2020-08-24

## 2020-08-24 NOTE — TELEPHONE ENCOUNTER
Called Thien/Donald and maru to [employer] to initiate prior authorization  Procedures approved    [facility] #[id number], eff 8/24 - 9/3/20

## 2020-09-03 ENCOUNTER — HOSPITAL ENCOUNTER (OUTPATIENT)
Dept: GASTROENTEROLOGY | Facility: MEDICAL CENTER | Age: 40
Setting detail: OUTPATIENT SURGERY
Discharge: HOME | End: 2020-09-03
Attending: INTERNAL MEDICINE
Payer: COMMERCIAL

## 2020-09-03 ENCOUNTER — PREP FOR PROCEDURE (OUTPATIENT)
Dept: GASTROENTEROLOGY | Facility: MEDICAL CENTER | Age: 40
End: 2020-09-03

## 2020-09-03 VITALS
OXYGEN SATURATION: 97 % | WEIGHT: 232 LBS | SYSTOLIC BLOOD PRESSURE: 122 MMHG | HEART RATE: 70 BPM | HEIGHT: 69 IN | BODY MASS INDEX: 34.36 KG/M2 | DIASTOLIC BLOOD PRESSURE: 67 MMHG | RESPIRATION RATE: 13 BRPM | TEMPERATURE: 98.2 F

## 2020-09-03 VITALS — HEART RATE: 62 BPM

## 2020-09-03 DIAGNOSIS — D64.9 ANEMIA, UNSPECIFIED TYPE: ICD-10-CM

## 2020-09-03 DIAGNOSIS — K62.5 BRBPR (BRIGHT RED BLOOD PER RECTUM): Primary | ICD-10-CM

## 2020-09-03 DIAGNOSIS — Z87.19 HISTORY OF HEMORRHOIDS: ICD-10-CM

## 2020-09-03 DIAGNOSIS — D64.9 ANEMIA, UNSPECIFIED TYPE: Primary | ICD-10-CM

## 2020-09-03 PROCEDURE — 88305 TISSUE EXAM BY PATHOLOGIST: CPT | Performed by: PATHOLOGY

## 2020-09-03 PROCEDURE — 43239 EGD BIOPSY SINGLE/MULTIPLE: CPT | Performed by: INTERNAL MEDICINE

## 2020-09-03 PROCEDURE — NC001 PR NO CHARGE: Performed by: INTERNAL MEDICINE

## 2020-09-03 PROCEDURE — 45385 COLONOSCOPY W/LESION REMOVAL: CPT | Performed by: INTERNAL MEDICINE

## 2020-09-03 RX ORDER — PROPOFOL 10 MG/ML
INJECTION, EMULSION INTRAVENOUS AS NEEDED
Status: DISCONTINUED | OUTPATIENT
Start: 2020-09-03 | End: 2020-09-03

## 2020-09-03 RX ORDER — SODIUM CHLORIDE 9 MG/ML
125 INJECTION, SOLUTION INTRAVENOUS CONTINUOUS
Status: DISCONTINUED | OUTPATIENT
Start: 2020-09-03 | End: 2020-09-07 | Stop reason: HOSPADM

## 2020-09-03 RX ORDER — LIDOCAINE HYDROCHLORIDE 20 MG/ML
INJECTION, SOLUTION EPIDURAL; INFILTRATION; INTRACAUDAL; PERINEURAL AS NEEDED
Status: DISCONTINUED | OUTPATIENT
Start: 2020-09-03 | End: 2020-09-03

## 2020-09-03 RX ADMIN — SODIUM CHLORIDE 125 ML/HR: 0.9 INJECTION, SOLUTION INTRAVENOUS at 07:47

## 2020-09-03 RX ADMIN — PROPOFOL 50 MG: 10 INJECTION, EMULSION INTRAVENOUS at 08:19

## 2020-09-03 RX ADMIN — PROPOFOL 20 MG: 10 INJECTION, EMULSION INTRAVENOUS at 08:39

## 2020-09-03 RX ADMIN — PROPOFOL 50 MG: 10 INJECTION, EMULSION INTRAVENOUS at 08:30

## 2020-09-03 RX ADMIN — PROPOFOL 100 MG: 10 INJECTION, EMULSION INTRAVENOUS at 08:16

## 2020-09-03 RX ADMIN — PROPOFOL 30 MG: 10 INJECTION, EMULSION INTRAVENOUS at 08:36

## 2020-09-03 RX ADMIN — PROPOFOL 50 MG: 10 INJECTION, EMULSION INTRAVENOUS at 08:27

## 2020-09-03 RX ADMIN — PROPOFOL 100 MG: 10 INJECTION, EMULSION INTRAVENOUS at 08:15

## 2020-09-03 RX ADMIN — PROPOFOL 50 MG: 10 INJECTION, EMULSION INTRAVENOUS at 08:24

## 2020-09-03 RX ADMIN — LIDOCAINE HYDROCHLORIDE 60 MG: 20 INJECTION, SOLUTION EPIDURAL; INFILTRATION; INTRACAUDAL; PERINEURAL at 08:15

## 2020-09-03 RX ADMIN — PROPOFOL 50 MG: 10 INJECTION, EMULSION INTRAVENOUS at 08:33

## 2020-09-03 NOTE — ADDENDUM NOTE
Addendum  created 09/03/20 1230 by Nadege Reyes,     Flowsheet accepted, Intraprocedure Flowsheets edited

## 2020-09-03 NOTE — ANESTHESIA PREPROCEDURE EVALUATION
Procedure:  COLONOSCOPY  EGD    Relevant Problems   ANESTHESIA (within normal limits)      GI/HEPATIC   (+) BRBPR (bright red blood per rectum)   (+) Gastroesophageal reflux disease without esophagitis      HEMATOLOGY   (+) Anemia      NEURO/PSYCH   (+) History of hemorrhoids        Physical Exam    Airway    Mallampati score: II  TM Distance: >3 FB  Neck ROM: full     Dental   No notable dental hx     Cardiovascular  Rhythm: regular, Rate: normal, Cardiovascular exam normal    Pulmonary  Pulmonary exam normal Breath sounds clear to auscultation,     Other Findings        Anesthesia Plan  ASA Score- 2     Anesthesia Type- IV sedation with anesthesia with ASA Monitors  Additional Monitors:   Airway Plan:     Comment: NC/Face Mask  Plan Factors-Exercise tolerance (METS): >4 METS  Chart reviewed  Existing labs reviewed  Patient summary reviewed  Patient is not a current smoker  Patient instructed to abstain from smoking on day of procedure  Patient did not smoke on day of surgery  Induction-     Postoperative Plan-     Informed Consent- Anesthetic plan and risks discussed with patient

## 2020-09-03 NOTE — ANESTHESIA POSTPROCEDURE EVALUATION
Post-Op Assessment Note    CV Status:  Stable    Pain management: adequate     Mental Status:  Alert and awake   Hydration Status:  Euvolemic   PONV Controlled:  Controlled   Airway Patency:  Patent      Post Op Vitals Reviewed: Yes      Staff: Anesthesiologist         No complications documented      /67 (09/03/20 0901)    Temp      Pulse 70 (09/03/20 0901)   Resp 13 (09/03/20 0901)    SpO2 97 % (09/03/20 0901)

## 2020-09-03 NOTE — H&P
H&P EXAM - Outpatient Endoscopy   Neha Mccarthy 41 y o  male MRN: [de-identified]    [facility]   Encounter: [id number]        Impression:  Colonoscopy and EGD for iron deficiency anemia    Plan:  EGD and colonoscopy    Chief Complaint:  History of GERD and iron deficiency  History of rectal bleeding  Physical Exam within normal limits

## 2020-09-03 NOTE — DISCHARGE INSTRUCTIONS

## 2020-09-09 DIAGNOSIS — A04.8 H. PYLORI INFECTION: Primary | ICD-10-CM

## 2020-09-09 RX ORDER — AMOXICILLIN 500 MG/1
1000 CAPSULE ORAL EVERY 12 HOURS SCHEDULED
Qty: 56 CAPSULE | Refills: 0 | Status: SHIPPED | OUTPATIENT
Start: 2020-09-09 | End: 2020-09-23

## 2020-09-09 RX ORDER — OMEPRAZOLE 20 MG/1
20 CAPSULE, DELAYED RELEASE ORAL DAILY
Qty: 28 CAPSULE | Refills: 0 | Status: SHIPPED | OUTPATIENT
Start: 2020-09-09 | End: 2020-09-23

## 2020-09-09 RX ORDER — CLARITHROMYCIN 500 MG/1
500 TABLET, COATED ORAL EVERY 12 HOURS SCHEDULED
Qty: 28 TABLET | Refills: 0 | Status: SHIPPED | OUTPATIENT
Start: 2020-09-09 | End: 2020-09-23

## 2020-09-21 ENCOUNTER — HOSPITAL ENCOUNTER (OUTPATIENT)
Dept: GASTROENTEROLOGY | Facility: HOSPITAL | Age: 40
Discharge: HOME | End: 2020-09-21
Attending: INTERNAL MEDICINE
Payer: COMMERCIAL

## 2020-09-21 DIAGNOSIS — D64.9 ANEMIA, UNSPECIFIED TYPE: ICD-10-CM

## 2020-09-21 PROCEDURE — 91110 GI TRC IMG INTRAL ESOPH-ILE: CPT

## 2020-09-29 PROCEDURE — 91110 GI TRC IMG INTRAL ESOPH-ILE: CPT | Performed by: INTERNAL MEDICINE

## 2021-02-15 ENCOUNTER — TELEPHONE (OUTPATIENT)
Dept: HEMATOLOGY ONCOLOGY | Facility: CLINIC | Age: 41
End: 2021-02-15

## 2021-02-16 ENCOUNTER — TELEPHONE (OUTPATIENT)
Dept: HEMATOLOGY ONCOLOGY | Facility: CLINIC | Age: 41
End: 2021-02-16

## 2021-02-16 NOTE — TELEPHONE ENCOUNTER
Patient did not return scheduling call  He did not complete labs prior to appt  Please reschedule and instruct patient to complete labs prior to follow up

## 2024-01-09 ENCOUNTER — OFFICE VISIT (OUTPATIENT)
Dept: FAMILY MEDICINE CLINIC | Facility: CLINIC | Age: 44
End: 2024-01-09
Payer: COMMERCIAL

## 2024-01-09 ENCOUNTER — APPOINTMENT (OUTPATIENT)
Dept: LAB | Facility: CLINIC | Age: 44
End: 2024-01-09
Payer: COMMERCIAL

## 2024-01-09 VITALS
HEART RATE: 80 BPM | OXYGEN SATURATION: 98 % | SYSTOLIC BLOOD PRESSURE: 122 MMHG | TEMPERATURE: 97.8 F | HEIGHT: 68 IN | BODY MASS INDEX: 36.89 KG/M2 | DIASTOLIC BLOOD PRESSURE: 78 MMHG | WEIGHT: 243.4 LBS

## 2024-01-09 DIAGNOSIS — E55.9 VITAMIN D DEFICIENCY: ICD-10-CM

## 2024-01-09 DIAGNOSIS — Z00.00 ANNUAL PHYSICAL EXAM: ICD-10-CM

## 2024-01-09 DIAGNOSIS — Z13.6 SCREENING FOR CARDIOVASCULAR CONDITION: ICD-10-CM

## 2024-01-09 DIAGNOSIS — Z11.59 NEED FOR HEPATITIS C SCREENING TEST: ICD-10-CM

## 2024-01-09 DIAGNOSIS — N52.9 ERECTILE DISORDER: ICD-10-CM

## 2024-01-09 DIAGNOSIS — Z11.4 SCREENING FOR HIV (HUMAN IMMUNODEFICIENCY VIRUS): ICD-10-CM

## 2024-01-09 DIAGNOSIS — R68.82 LOW LIBIDO: ICD-10-CM

## 2024-01-09 DIAGNOSIS — Z00.00 ANNUAL PHYSICAL EXAM: Primary | ICD-10-CM

## 2024-01-09 PROBLEM — S22.49XA FRACTURE OF MULTIPLE RIBS: Status: ACTIVE | Noted: 2023-10-01

## 2024-01-09 LAB
25(OH)D3 SERPL-MCNC: 17.9 NG/ML (ref 30–100)
ALBUMIN SERPL BCP-MCNC: 4.6 G/DL (ref 3.5–5)
ALP SERPL-CCNC: 68 U/L (ref 34–104)
ALT SERPL W P-5'-P-CCNC: 28 U/L (ref 7–52)
ANION GAP SERPL CALCULATED.3IONS-SCNC: 9 MMOL/L
AST SERPL W P-5'-P-CCNC: 25 U/L (ref 13–39)
BILIRUB SERPL-MCNC: 0.6 MG/DL (ref 0.2–1)
BUN SERPL-MCNC: 14 MG/DL (ref 5–25)
CALCIUM SERPL-MCNC: 9.4 MG/DL (ref 8.4–10.2)
CHLORIDE SERPL-SCNC: 107 MMOL/L (ref 96–108)
CHOLEST SERPL-MCNC: 155 MG/DL
CO2 SERPL-SCNC: 27 MMOL/L (ref 21–32)
CREAT SERPL-MCNC: 0.69 MG/DL (ref 0.6–1.3)
ERYTHROCYTE [DISTWIDTH] IN BLOOD BY AUTOMATED COUNT: 17.3 % (ref 11.6–15.1)
GFR SERPL CREATININE-BSD FRML MDRD: 114 ML/MIN/1.73SQ M
GLUCOSE P FAST SERPL-MCNC: 100 MG/DL (ref 65–99)
HCT VFR BLD AUTO: 42.7 % (ref 36.5–49.3)
HCV AB SER QL: NORMAL
HDLC SERPL-MCNC: 39 MG/DL
HGB BLD-MCNC: 12.7 G/DL (ref 12–17)
HIV 1+2 AB+HIV1 P24 AG SERPL QL IA: NORMAL
HIV 2 AB SERPL QL IA: NORMAL
HIV1 AB SERPL QL IA: NORMAL
HIV1 P24 AG SERPL QL IA: NORMAL
LDLC SERPL CALC-MCNC: 91 MG/DL (ref 0–100)
MCH RBC QN AUTO: 21.5 PG (ref 26.8–34.3)
MCHC RBC AUTO-ENTMCNC: 29.7 G/DL (ref 31.4–37.4)
MCV RBC AUTO: 72 FL (ref 82–98)
NONHDLC SERPL-MCNC: 116 MG/DL
PLATELET # BLD AUTO: 200 THOUSANDS/UL (ref 149–390)
POTASSIUM SERPL-SCNC: 4.1 MMOL/L (ref 3.5–5.3)
PROT SERPL-MCNC: 7.3 G/DL (ref 6.4–8.4)
RBC # BLD AUTO: 5.92 MILLION/UL (ref 3.88–5.62)
SODIUM SERPL-SCNC: 143 MMOL/L (ref 135–147)
TRIGL SERPL-MCNC: 124 MG/DL
TSH SERPL DL<=0.05 MIU/L-ACNC: 1.17 UIU/ML (ref 0.45–4.5)
WBC # BLD AUTO: 6.12 THOUSAND/UL (ref 4.31–10.16)

## 2024-01-09 PROCEDURE — 85027 COMPLETE CBC AUTOMATED: CPT

## 2024-01-09 PROCEDURE — 36415 COLL VENOUS BLD VENIPUNCTURE: CPT

## 2024-01-09 PROCEDURE — 80053 COMPREHEN METABOLIC PANEL: CPT

## 2024-01-09 PROCEDURE — 99386 PREV VISIT NEW AGE 40-64: CPT | Performed by: FAMILY MEDICINE

## 2024-01-09 PROCEDURE — 86803 HEPATITIS C AB TEST: CPT

## 2024-01-09 PROCEDURE — 99204 OFFICE O/P NEW MOD 45 MIN: CPT | Performed by: FAMILY MEDICINE

## 2024-01-09 PROCEDURE — 84443 ASSAY THYROID STIM HORMONE: CPT

## 2024-01-09 PROCEDURE — 80061 LIPID PANEL: CPT

## 2024-01-09 PROCEDURE — 82306 VITAMIN D 25 HYDROXY: CPT

## 2024-01-09 PROCEDURE — 87389 HIV-1 AG W/HIV-1&-2 AB AG IA: CPT
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
## 2024-01-09 NOTE — PATIENT INSTRUCTIONS
Please get your labwork done fasting.  Do not eat/drink for about 8-12 hours prior to getting the labwork done, however you may drink water or black coffee while you are fasting.  Please use a [facility] lab if possible, as we receive the lab results more quickly.  We will notify you of your labwork results even if they are normal.  Please contact us if you do not hear about your lab results after one week.  Please eat low carb, high protein diet, drink at least 110 ounces water daily, walk at least 30 minutes daily.  Yearly visits are recommended. It was a pleasure meeting you today      Wellness Visit for Adults   AMBULATORY CARE:   A wellness visit  is when you see your healthcare provider to get screened for health problems. Your healthcare provider will also give you advice on how to stay healthy. Write down your questions so you remember to ask them. Ask your healthcare provider how often you should have a wellness visit.  What happens at a wellness visit:  Your healthcare provider will ask about your health, and your family history of health problems. This includes high blood pressure, heart disease, and cancer. He or she will ask if you have symptoms that concern you, if you smoke, and about your mood. You may also be asked about your intake of medicines, supplements, food, and alcohol. Any of the following may be done:  Your weight  will be checked. Your height may also be checked so your body mass index (BMI) can be calculated. Your BMI shows if you are at a healthy weight.    Your blood pressure  and heart rate will be checked. Your temperature may also be checked.    Blood and urine tests  may be done. Blood tests may be done to check your cholesterol levels. Abnormal cholesterol levels increase your risk for heart disease and stroke. You may also need a blood or urine test to check for diabetes if you are at increased risk. Urine tests may be done to look for signs of an infection or kidney  disease.    A physical exam  includes checking your heartbeat and lungs with a stethoscope. Your healthcare provider may also check your skin to look for sun damage.    Screening tests  may be recommended. A screening test is done to check for diseases that may not cause symptoms. The screening tests you may need depend on your age, gender, family history, and lifestyle habits. For example, colorectal screening may be recommended if you are 50 years old or older.    Screening tests you need if you are a woman:   A Pap smear  is used to screen for cervical cancer. Pap smears are usually done every 3 to 5 years depending on your age. You may need them more often if you have had abnormal Pap smear test results in the past. Ask your healthcare provider how often you should have a Pap smear.    A mammogram  is an x-ray of your breasts to screen for breast cancer. Experts recommend mammograms every 2 years starting at age 50 years. You may need a mammogram at age 49 years or younger if you have an increased risk for breast cancer. Talk to your healthcare provider about when you should start having mammograms and how often you need them.    Vaccines you may need:   Get an influenza vaccine  every year. The influenza vaccine protects you from the flu. Several types of viruses cause the flu. The viruses change over time, so new vaccines are made each year.    Get a tetanus-diphtheria (Td) booster vaccine  every 10 years. This vaccine protects you against tetanus and diphtheria. Tetanus is a severe infection that may cause painful muscle spasms and lockjaw. Diphtheria is a severe bacterial infection that causes a thick covering in the back of your mouth and throat.    Get a human papillomavirus (HPV) vaccine  if you are female and aged 19 to 26 or male 19 to 21 and never received it. This vaccine protects you from HPV infection. HPV is the most common infection spread by sexual contact. HPV may also cause vaginal, penile, and  anal cancers.    Get a pneumococcal vaccine  if you are aged 65 years or older. The pneumococcal vaccine is an injection given to protect you from pneumococcal disease. Pneumococcal disease is an infection caused by pneumococcal bacteria. The infection may cause pneumonia, meningitis, or an ear infection.    Get a shingles vaccine  if you are 60 or older, even if you have had shingles before. The shingles vaccine is an injection to protect you from the varicella-zoster virus. This is the same virus that causes chickenpox. Shingles is a painful rash that develops in people who had chickenpox or have been exposed to the virus.    How to eat healthy:  My Plate is a model for planning healthy meals. It shows the types and amounts of foods that should go on your plate. Fruits and vegetables make up about half of your plate, and grains and protein make up the other half. A serving of dairy is included on the side of your plate. The amount of calories and serving sizes you need depends on your age, gender, weight, and height. Examples of healthy foods are listed below:  Eat a variety of vegetables  such as dark green, red, and orange vegetables. You can also include canned vegetables low in sodium (salt) and frozen vegetables without added butter or sauces.    Eat a variety of fresh fruits , canned fruit in 100% juice, frozen fruit, and dried fruit.    Include whole grains.  At least half of the grains you eat should be whole grains. Examples include whole-wheat bread, wheat pasta, brown rice, and whole-grain cereals such as oatmeal.    Eat a variety of protein foods such as seafood (fish and shellfish), lean meat, and poultry without skin (turkey and chicken). Examples of lean meats include pork leg, shoulder, or tenderloin, and beef round, sirloin, tenderloin, and extra lean ground beef. Other protein foods include eggs and egg substitutes, beans, peas, soy products, nuts, and seeds.    Choose low-fat dairy products such  as skim or 1% milk or low-fat yogurt, cheese, and cottage cheese.    Limit unhealthy fats  such as butter, hard margarine, and shortening.       Exercise:  Exercise at least 30 minutes per day on most days of the week. Some examples of exercise include walking, biking, dancing, and swimming. You can also fit in more physical activity by taking the stairs instead of the elevator or parking farther away from stores. Include muscle strengthening activities 2 days each week. Regular exercise provides many health benefits. It helps you manage your weight, and decreases your risk for type 2 diabetes, heart disease, stroke, and high blood pressure. Exercise can also help improve your mood. Ask your healthcare provider about the best exercise plan for you.       General health and safety guidelines:   Do not smoke.  Nicotine and other chemicals in cigarettes and cigars can cause lung damage. Ask your healthcare provider for information if you currently smoke and need help to quit. E-cigarettes or smokeless tobacco still contain nicotine. Talk to your healthcare provider before you use these products.    Limit alcohol.  A drink of alcohol is 12 ounces of beer, 5 ounces of wine, or 1½ ounces of liquor.    Lose weight, if needed.  Being overweight increases your risk of certain health conditions. These include heart disease, high blood pressure, type 2 diabetes, and certain types of cancer.    Protect your skin.  Do not sunbathe or use tanning beds. Use sunscreen with a SPF 15 or higher. Apply sunscreen at least 15 minutes before you go outside. Reapply sunscreen every 2 hours. Wear protective clothing, hats, and sunglasses when you are outside.    Drive safely.  Always wear your seatbelt. Make sure everyone in your car wears a seatbelt. A seatbelt can save your life if you are in an accident. Do not use your cell phone when you are driving. This could distract you and cause an accident. Pull over if you need to make a call or  send a text message.    Practice safe sex.  Use latex condoms if are sexually active and have more than one partner. Your healthcare provider may recommend screening tests for sexually transmitted infections (STIs).    Wear helmets, lifejackets, and protective gear.  Always wear a helmet when you ride a bike or motorcycle, go skiing, or play sports that could cause a head injury. Wear protective equipment when you play sports. Wear a lifejacket when you are on a boat or doing water sports.    © Copyright [employer] 2023 Information is for End User's use only and may not be sold, redistributed or otherwise used for commercial purposes.  The above information is an  only. It is not intended as medical advice for individual conditions or treatments. Talk to your doctor, nurse or pharmacist before following any medical regimen to see if it is safe and effective for you.

## 2024-01-16 DIAGNOSIS — E55.9 VITAMIN D DEFICIENCY: Primary | ICD-10-CM

## 2024-01-16 RX ORDER — ERGOCALCIFEROL 1.25 MG/1
50000 CAPSULE ORAL 3 TIMES WEEKLY
Qty: 40 CAPSULE | Refills: 0 | Status: SHIPPED | OUTPATIENT
Start: 2024-01-17

## 2024-01-25 DIAGNOSIS — R79.89 ABNORMAL CBC: Primary | ICD-10-CM

## 2024-02-19 ENCOUNTER — LAB (OUTPATIENT)
Dept: LAB | Facility: CLINIC | Age: 44
End: 2024-02-19
Payer: COMMERCIAL

## 2024-02-19 DIAGNOSIS — D50.9 IRON DEFICIENCY ANEMIA, UNSPECIFIED IRON DEFICIENCY ANEMIA TYPE: ICD-10-CM

## 2024-02-19 DIAGNOSIS — R79.89 ABNORMAL CBC: ICD-10-CM

## 2024-02-19 LAB
FERRITIN SERPL-MCNC: 7 NG/ML (ref 24–336)
IRON SATN MFR SERPL: 9 % (ref 15–50)
IRON SERPL-MCNC: 39 UG/DL (ref 50–212)
TIBC SERPL-MCNC: 446 UG/DL (ref 250–450)
UIBC SERPL-MCNC: 407 UG/DL (ref 155–355)

## 2024-02-19 PROCEDURE — 85025 COMPLETE CBC W/AUTO DIFF WBC: CPT

## 2024-02-19 PROCEDURE — 82728 ASSAY OF FERRITIN: CPT

## 2024-02-19 PROCEDURE — 36415 COLL VENOUS BLD VENIPUNCTURE: CPT

## 2024-02-19 PROCEDURE — 83540 ASSAY OF IRON: CPT

## 2024-02-19 PROCEDURE — 83550 IRON BINDING TEST: CPT

## 2024-02-20 ENCOUNTER — TELEPHONE (OUTPATIENT)
Age: 44
End: 2024-02-20

## 2024-02-20 ENCOUNTER — TELEPHONE (OUTPATIENT)
Dept: OTHER | Facility: OTHER | Age: 44
End: 2024-02-20

## 2024-02-20 DIAGNOSIS — D50.9 IRON DEFICIENCY ANEMIA, UNSPECIFIED IRON DEFICIENCY ANEMIA TYPE: Primary | ICD-10-CM

## 2024-02-20 LAB
BASOPHILS # BLD AUTO: 0.04 THOUSANDS/ÂΜL (ref 0–0.1)
BASOPHILS NFR BLD AUTO: 1 % (ref 0–1)
EOSINOPHIL # BLD AUTO: 0.13 THOUSAND/ÂΜL (ref 0–0.61)
EOSINOPHIL NFR BLD AUTO: 3 % (ref 0–6)
ERYTHROCYTE [DISTWIDTH] IN BLOOD BY AUTOMATED COUNT: 18.5 % (ref 11.6–15.1)
HCT VFR BLD AUTO: 43.4 % (ref 36.5–49.3)
HGB BLD-MCNC: 12.5 G/DL (ref 12–17)
IMM GRANULOCYTES # BLD AUTO: 0.01 THOUSAND/UL (ref 0–0.2)
IMM GRANULOCYTES NFR BLD AUTO: 0 % (ref 0–2)
LYMPHOCYTES # BLD AUTO: 1.14 THOUSANDS/ÂΜL (ref 0.6–4.47)
LYMPHOCYTES NFR BLD AUTO: 23 % (ref 14–44)
MCH RBC QN AUTO: 22.1 PG (ref 26.8–34.3)
MCHC RBC AUTO-ENTMCNC: 28.8 G/DL (ref 31.4–37.4)
MCV RBC AUTO: 77 FL (ref 82–98)
MONOCYTES # BLD AUTO: 0.42 THOUSAND/ÂΜL (ref 0.17–1.22)
MONOCYTES NFR BLD AUTO: 9 % (ref 4–12)
NEUTROPHILS # BLD AUTO: 3.15 THOUSANDS/ÂΜL (ref 1.85–7.62)
NEUTS SEG NFR BLD AUTO: 64 % (ref 43–75)
NRBC BLD AUTO-RTO: 0 /100 WBCS
PLATELET # BLD AUTO: 277 THOUSANDS/UL (ref 149–390)
RBC # BLD AUTO: 5.65 MILLION/UL (ref 3.88–5.62)
WBC # BLD AUTO: 4.89 THOUSAND/UL (ref 4.31–10.16)

## 2024-02-20 NOTE — TELEPHONE ENCOUNTER
Anemia panel with reflex needs to be reordered along with a cbc or hnh. The two tests must be reordered for the lab to process pts blood.     Lab is currently basing anemia panel off of old January hemoglobin which they say is not ok.    Can we please reorder asap?

## 2024-02-20 NOTE — TELEPHONE ENCOUNTER
Galilea from [facility] called in stating that an anmnia panel is needed in order to get hematology results. Please send over.

## 2024-02-27 ENCOUNTER — TELEPHONE (OUTPATIENT)
Dept: HEMATOLOGY ONCOLOGY | Facility: CLINIC | Age: 44
End: 2024-02-27

## 2024-02-27 NOTE — TELEPHONE ENCOUNTER
Appointment Schedule   Who are you speaking with? Patient   If it is not the patient, are they listed on an active communication consent form? N/A   Which provider is the appointment scheduled with? Krystal May PA-C   At which location is the appointment scheduled for? [place]   When is the appointment scheduled?  Please list date and time 4/16/24 8:00am   What is the reason for this appointment? 6 month follow up   Did patient voice understanding of the details of this appointment? Yes   Was the no show policy reviewed with patient? Yes         Patient is unable to attend his appointment today with Krystal May due to a family emergency.  Patient apologizes for missing the appointment.  Patient [place] to go out of state at this time.

## 2024-04-16 ENCOUNTER — OFFICE VISIT (OUTPATIENT)
Dept: HEMATOLOGY ONCOLOGY | Facility: CLINIC | Age: 44
End: 2024-04-16
Payer: COMMERCIAL

## 2024-04-16 ENCOUNTER — TELEPHONE (OUTPATIENT)
Dept: INFUSION CENTER | Facility: HOSPITAL | Age: 44
End: 2024-04-16

## 2024-04-16 ENCOUNTER — TELEPHONE (OUTPATIENT)
Dept: HEMATOLOGY ONCOLOGY | Facility: CLINIC | Age: 44
End: 2024-04-16

## 2024-04-16 VITALS
BODY MASS INDEX: 37.59 KG/M2 | TEMPERATURE: 96.9 F | DIASTOLIC BLOOD PRESSURE: 70 MMHG | SYSTOLIC BLOOD PRESSURE: 128 MMHG | RESPIRATION RATE: 16 BRPM | HEART RATE: 85 BPM | OXYGEN SATURATION: 98 % | WEIGHT: 248 LBS | HEIGHT: 68 IN

## 2024-04-16 DIAGNOSIS — D50.8 IRON DEFICIENCY ANEMIA REFRACTORY TO IRON THERAPY: ICD-10-CM

## 2024-04-16 DIAGNOSIS — A04.8 H. PYLORI INFECTION: Primary | ICD-10-CM

## 2024-04-16 PROCEDURE — 99204 OFFICE O/P NEW MOD 45 MIN: CPT | Performed by: PHYSICIAN ASSISTANT

## 2024-04-16 RX ORDER — SODIUM CHLORIDE 9 MG/ML
20 INJECTION, SOLUTION INTRAVENOUS ONCE
OUTPATIENT
Start: 2024-04-30

## 2024-04-16 NOTE — TELEPHONE ENCOUNTER
Called and spoke directly with pt at this time to schedule Venofer infusion on 5/3/24 at 1130 am at [facility] infusion center. Pt provided with address of unit. He will scheudle his following appts week to week at infusion d/t his work schedule.

## 2024-04-16 NOTE — PROGRESS NOTES
Hematology/Oncology Outpatient Follow-up  Gabriel Raza 44 y.o. male 1980 [id number]    Date:  4/16/2024      Assessment and Plan:  1. H. pylori infection  - H. pylori antigen, stool; Future    2. Iron deficiency anemia refractory to iron therapy  Has had GI workup, due for repeat colonoscopy.  He is aware of this.  We reviewed that H. pylori infection can lead to much iron malabsorption.  Capsule endoscopy also showed a small bowel erosion, possible intermittent bleeding of the small bowel related to this?  Due to symptoms and low ferritin, recommend IV iron.  He has failed oral iron as he has been taking without improvement.  This would point towards continued concern for malabsorption etiology.  He needs to complete stool testing as above.  Proceed with Venofer 200 mg IV weekly x 6.  Follow-up in 4 months with repeat labs to determine continuation of maintenance IV iron if needed.    - CBC and differential; Future  - Iron Panel (Includes Ferritin, Iron Sat%, Iron, and TIBC); Future       HPI:  44-year-old male presents for follow-up visit.  He was last seen in August 2020.  Prior to that he was seen in April 2020.  He presented due to iron deficiency with anemia.    History of hemorrhoids.  He underwent an EGD, colonoscopy, capsule endoscopy in September 2020.    This showed a normal esophagus, stomach, duodenum.  Biopsies were taken which were negative for celiac but positive for H. pylori.  Patient states he thinks that he did complete the treatment for H. pylori but chart indicates that he did not complete follow-up H. pylori stool testing.    Colonoscopy showed 4 polyps which were removed.  A repeat colonoscopy was recommended in 3 years as well as a high-fiber diet and increasing fluid intake.    Capsule endoscopy showed small erosion in the small bowel, no active bleeding noted    Is back to the office today due to persistent iron deficiency anemia.    Interval history:    ROS: Review of Systems    Constitutional:  Positive for fatigue. Negative for appetite change and unexpected weight change.   Respiratory:  Positive for shortness of breath.    Cardiovascular:  Positive for palpitations (Has had cardiac workup which was negative).   Gastrointestinal:  Negative for abdominal pain, constipation, diarrhea, nausea and vomiting.   Genitourinary:  Negative for difficulty urinating, dysuria and hematuria.   Musculoskeletal:  Negative for arthralgias and myalgias.   Skin: Negative.    Neurological:         Had restless leg but improved when restarted oral iron and now resolved   Hematological: Negative.    Psychiatric/Behavioral: Negative.       Past Medical History:   Diagnosis Date    Allergic     Since childhood    Anemia     GERD (gastroesophageal reflux disease)     Lipoma     Palpitations        Past Surgical History:   Procedure Laterality Date    APPENDECTOMY      MD EXC B9 LESION MRGN XCP SK TG T/A/L 0.5 CM/< N/A 2016    Procedure: EXCISION SEBACEOUS CYST(X4) RIGHT FLANK, LEFT FLANK; RIGHT AND LEFT LOWER ABDOMEN;  Surgeon: Lexi Richards MD;  Location: [facility] OR;  Service: General       Social History     Socioeconomic History    Marital status: /Civil Union     Spouse name: None    Number of children: None    Years of education: None    Highest education level: None   Occupational History    None   Tobacco Use    Smoking status: Former     Current packs/day: 0.00     Average packs/day: 1 pack/day for 15.0 years (15.0 ttl pk-yrs)     Types: Cigarettes     Start date: 1997     Quit date: 2012     Years since quittin.2     Passive exposure: Past    Smokeless tobacco: Never    Tobacco comments:     1 ppd for 12 years, quit about in    Vaping Use    Vaping status: Never Used   Substance and Sexual Activity    Alcohol use: Yes     Comment: Social only    Drug use: No     Comment: edible marijuana occasionally for training    Sexual activity: Yes     Partners: Female     Comment:  Wife is on birthcontrol   Other Topics Concern    None   Social History Narrative    None     Social Determinants of Health     Financial Resource Strain: Low Risk  (10/1/2023)    Received from [facility]    Overall Financial Resource Strain (CARDIA)     Difficulty of Paying Living Expenses: Not hard at all   Food Insecurity: No Food Insecurity (10/1/2023)    Received from [facility]    Hunger Vital Sign     Worried About Running Out of Food in the Last Year: Never true     Ran Out of Food in the Last Year: Never true   Transportation Needs: No Transportation Needs (10/1/2023)    Received from [facility]    PRAPARE - Transportation     Lack of Transportation (Medical): No     Lack of Transportation (Non-Medical): No   Physical Activity: Not on file   Stress: Stress Concern Present (9/15/2021)    Received from [facility]    [employer] [place] [employer] - Occupational Stress Questionnaire     Feeling of Stress : To some extent   Social Connections: Moderately Isolated (9/15/2021)    Received from [facility]    Social Connection and Isolation Panel [NHANES]     Frequency of Communication with Friends and Family: More than three times a week     Frequency of Social Gatherings with Friends and Family: Once a week     Attends [employer] Services: Never     Active Member of Clubs or Organizations: No     Attends Club or Organization Meetings: Never     Marital Status:    Intimate Partner Violence: Not At Risk (10/1/2023)    Received from [facility]    Humiliation, Afraid, Rape, and Kick questionnaire     Fear of Current or Ex-Partner: No     Emotionally Abused: No     Physically Abused: No     Sexually Abused: No   Housing Stability: Low Risk  (10/1/2023)    Received from [facility]    Housing Stability Vital Sign     Unable to Pay for Housing in the Last Year: No     Number of  "Places Lived in the Last Year: 1     Unstable Housing in the Last Year: No       Family History   Problem Relation Age of Onset    Diabetes Father     Alcohol abuse Father     Diabetes Cousin     Leukemia Cousin     Cancer Cousin     Diabetes Brother     Thyroid disease Maternal Aunt     Cancer Maternal Grandmother        Allergies   Allergen Reactions    Nuts - Food Allergy Throat Swelling     Only when eating just nuts, doesn't react all the time    Other Cough and Other (See Comments)     mild    Seasonal Ic  [Cholestatin]          Current Outpatient Medications:     Cetirizine HCl (ZYRTEC ALLERGY PO), Take by mouth., Disp: , Rfl:     ergocalciferol (VITAMIN D2) 50,000 units, Take 1 capsule (50,000 Units total) by mouth 3 (three) times a week, Disp: 40 capsule, Rfl: 0    ferrous gluconate (Iron 27) 240 (27 FE) MG tablet, Take 27 mg by mouth daily, Disp: , Rfl:     Multiple Vitamins-Minerals (MULTIVITAMIN PO), Take by mouth, Disp: , Rfl:     Omega-3 Fatty Acids (FISH OIL PO), Take by mouth daily, Disp: , Rfl:       Physical Exam:  /70 (BP Location: Left arm, Patient Position: Sitting, Cuff Size: Adult)   Pulse 85   Temp (!) 96.9 °F (36.1 °C) (Temporal)   Resp 16   Ht 5' 8\" (1.727 m)   Wt 112 kg (248 lb)   SpO2 98%   BMI 37.71 kg/m²     Physical Exam  Vitals reviewed.   Constitutional:       General: He is not in acute distress.     Appearance: He is well-developed. He is not ill-appearing.   HENT:      Head: Normocephalic and atraumatic.   Eyes:      General: No scleral icterus.     Conjunctiva/sclera: Conjunctivae normal.   Cardiovascular:      Rate and Rhythm: Normal rate and regular rhythm.      Heart sounds: Normal heart sounds. No murmur heard.  Pulmonary:      Effort: Pulmonary effort is normal. No respiratory distress.      Breath sounds: Normal breath sounds.   Abdominal:      Palpations: Abdomen is soft.      Tenderness: There is no abdominal tenderness.   Musculoskeletal:         General: No " tenderness. Normal range of motion.      Cervical back: Normal range of motion and neck supple.      Right lower leg: No edema.      Left lower leg: No edema.   Lymphadenopathy:      Cervical: No cervical adenopathy.   Skin:     General: Skin is warm and dry.   Neurological:      Mental Status: He is alert and oriented to person, place, and time.      Cranial Nerves: No cranial nerve deficit.   Psychiatric:         Mood and Affect: Mood normal.         Behavior: Behavior normal.       Labs:  Lab Results   Component Value Date    WBC 4.89 02/19/2024    HGB 12.5 02/19/2024    HCT 43.4 02/19/2024    MCV 77 (L) 02/19/2024     02/19/2024       I have spent 30 minutes with Patient  today in which greater than 50% of this time was spent in counseling/coordination of care regarding Diagnostic results, Risks and benefits of tx options, Instructions for management, Impressions, Documenting in the medical record, Reviewing / ordering tests, medicine, procedures  , and Obtaining or reviewing history  .     Patient voiced understanding and agreement in the above discussion. Aware to contact our office with questions/symptoms in the interim.     This note has been generated by voice recognition software system.  Therefore, there may be spelling, grammar, and or syntax errors. Please contact if questions arise.

## 2024-04-25 ENCOUNTER — OFFICE VISIT (OUTPATIENT)
Dept: FAMILY MEDICINE CLINIC | Facility: CLINIC | Age: 44
End: 2024-04-25
Payer: COMMERCIAL

## 2024-04-25 ENCOUNTER — APPOINTMENT (OUTPATIENT)
Dept: LAB | Facility: CLINIC | Age: 44
End: 2024-04-25
Payer: COMMERCIAL

## 2024-04-25 VITALS
TEMPERATURE: 97.9 F | BODY MASS INDEX: 39.07 KG/M2 | OXYGEN SATURATION: 99 % | HEART RATE: 77 BPM | SYSTOLIC BLOOD PRESSURE: 128 MMHG | HEIGHT: 68 IN | WEIGHT: 257.8 LBS | DIASTOLIC BLOOD PRESSURE: 72 MMHG

## 2024-04-25 DIAGNOSIS — R94.31 ABNORMAL EKG: ICD-10-CM

## 2024-04-25 DIAGNOSIS — Z87.19 HISTORY OF HEMORRHOIDS: ICD-10-CM

## 2024-04-25 DIAGNOSIS — R00.9 ABNORMAL HEART RATE: ICD-10-CM

## 2024-04-25 DIAGNOSIS — R00.2 PALPITATIONS: Primary | ICD-10-CM

## 2024-04-25 DIAGNOSIS — Z86.010 HISTORY OF COLONIC POLYPS: ICD-10-CM

## 2024-04-25 DIAGNOSIS — I45.2 BIFASCICULAR BLOCK: ICD-10-CM

## 2024-04-25 DIAGNOSIS — D50.8 IRON DEFICIENCY ANEMIA REFRACTORY TO IRON THERAPY: ICD-10-CM

## 2024-04-25 DIAGNOSIS — Z12.11 SCREENING FOR COLON CANCER: ICD-10-CM

## 2024-04-25 PROCEDURE — 93000 ELECTROCARDIOGRAM COMPLETE: CPT | Performed by: FAMILY MEDICINE

## 2024-04-25 PROCEDURE — 99214 OFFICE O/P EST MOD 30 MIN: CPT | Performed by: FAMILY MEDICINE

## 2024-04-25 NOTE — PROGRESS NOTES
Subjective:    ARIA Rios is a 44 y.o. male here today for:  Chief Complaint   Patient presents with    Follow-up     Patient states he is following up after hematology   .      ---Above per clinical staff & reviewed. ---  HPI:  44yom here for follow up  Has seen hem/onc for his iron deficiency- is getting infusions started in May  Due for colonoscopy - referral placed  Feels heart racing at time, feels irregular to him also  Doesn't last long but happens about once every 2 weeks, has been happening for awhile  Denies chest pain, SOB, fainting  EKG with bifascicular block- will refer to cardiology  Problems with erectile dysfunction- did not get testosterone checked at last labs- will get done today    The following portions of the patient's history were reviewed and updated as appropriate: allergies, current medications, past family history, past medical history, past social history, past surgical history and problem list.    Past Medical History:   Diagnosis Date    Allergic     Since childhood    Anemia     GERD (gastroesophageal reflux disease)     Lipoma     Palpitations        Past Surgical History:   Procedure Laterality Date    APPENDECTOMY      FL EXC B9 LESION MRGN XCP SK TG T/A/L 0.5 CM/< N/A 2016    Procedure: EXCISION SEBACEOUS CYST(X4) RIGHT FLANK, LEFT FLANK; RIGHT AND LEFT LOWER ABDOMEN;  Surgeon: Lexi Richards MD;  Location: [facility];  Service: General       Social History     Socioeconomic History    Marital status: /Civil Union     Spouse name: None    Number of children: None    Years of education: None    Highest education level: None   Occupational History    None   Tobacco Use    Smoking status: Former     Current packs/day: 0.00     Average packs/day: 1 pack/day for 15.0 years (15.0 ttl pk-yrs)     Types: Cigarettes     Start date: 1997     Quit date: 2012     Years since quittin.3     Passive exposure: Past    Smokeless tobacco: Never    Tobacco comments:     1  ppd for 12 years, quit about in 2012   Vaping Use    Vaping status: Never Used   Substance and Sexual Activity    Alcohol use: Yes     Comment: Social only    Drug use: No     Comment: edible marijuana occasionally for training    Sexual activity: Yes     Partners: Female     Comment: Wife is on birthcontrol   Other Topics Concern    None   Social History Narrative    None     Social Determinants of Health     Financial Resource Strain: Low Risk  (10/1/2023)    Received from [facility]    Overall Financial Resource Strain (CARDIA)     Difficulty of Paying Living Expenses: Not hard at all   Food Insecurity: No Food Insecurity (10/1/2023)    Received from [facility]    Hunger Vital Sign     Worried About Running Out of Food in the Last Year: Never true     Ran Out of Food in the Last Year: Never true   Transportation Needs: No Transportation Needs (10/1/2023)    Received from [facility]    PRAPARE - Transportation     Lack of Transportation (Medical): No     Lack of Transportation (Non-Medical): No   Physical Activity: Not on file   Stress: Stress Concern Present (9/15/2021)    Received from [facility]    [employer] - Occupational Stress Questionnaire     Feeling of Stress : To some extent   Social Connections: Moderately Isolated (9/15/2021)    Received from [facility]    Social Connection and Isolation Panel [NHANES]     Frequency of Communication with Friends and Family: More than three times a week     Frequency of Social Gatherings with Friends and Family: Once a week     Attends Tenri[employer] Services: Never     Active Member of Clubs or Organizations: No     Attends Club or Organization Meetings: Never     Marital Status:    Intimate Partner Violence: Not At Risk (10/1/2023)    Received from [facility]    Humiliation, Afraid, Rape, and Kick questionnaire     Fear of  "Current or Ex-Partner: No     Emotionally Abused: No     Physically Abused: No     Sexually Abused: No   Housing Stability: Low Risk  (10/1/2023)    Received from [facility]    Housing Stability Vital Sign     Unable to Pay for Housing in the Last Year: No     Number of Places Lived in the Last Year: 1     Unstable Housing in the Last Year: No       Current Outpatient Medications   Medication Sig Dispense Refill    Cetirizine HCl (ZYRTEC ALLERGY PO) Take by mouth.      ergocalciferol (VITAMIN D2) 50,000 units Take 1 capsule (50,000 Units total) by mouth 3 (three) times a week 40 capsule 0    ferrous gluconate (Iron 27) 240 (27 FE) MG tablet Take 27 mg by mouth daily      Multiple Vitamins-Minerals (MULTIVITAMIN PO) Take by mouth      Omega-3 Fatty Acids (FISH OIL PO) Take by mouth daily       No current facility-administered medications for this visit.        Review of Systems   Constitutional: Negative.  Negative for chills and fever.   HENT: Negative.  Negative for ear pain and sore throat.    Eyes:  Negative for pain and visual disturbance.   Respiratory: Negative.  Negative for cough and shortness of breath.    Cardiovascular:  Positive for palpitations. Negative for chest pain.   Gastrointestinal: Negative.  Negative for abdominal pain and vomiting.   Genitourinary: Negative.  Negative for dysuria and hematuria.   Musculoskeletal: Negative.  Negative for arthralgias and back pain.   Skin: Negative.  Negative for color change and rash.   Neurological: Negative.  Negative for seizures and syncope.   Psychiatric/Behavioral:  The patient is nervous/anxious.    All other systems reviewed and are negative.       Objective:    /72 (BP Location: Left arm, Patient Position: Sitting, Cuff Size: Large)   Pulse 77   Temp 97.9 °F (36.6 °C) (Temporal)   Ht 5' 8\" (1.727 m)   Wt 117 kg (257 lb 12.8 oz)   SpO2 99%   BMI 39.20 kg/m²   Wt Readings from Last 3 Encounters:   04/25/24 117 kg (257 lb " 12.8 oz)   04/16/24 112 kg (248 lb)   01/09/24 110 kg (243 lb 6.4 oz)     BP Readings from Last 3 Encounters:   04/25/24 128/72   04/16/24 128/70   01/09/24 122/78       Lab Results   Component Value Date    WBC 4.89 02/19/2024    HGB 12.5 02/19/2024    HCT 43.4 02/19/2024     02/19/2024    TRIG 124 01/09/2024    HDL 39 (L) 01/09/2024    ALT 28 01/09/2024    AST 25 01/09/2024    K 4.1 01/09/2024     01/09/2024    CREATININE 0.69 01/09/2024    BUN 14 01/09/2024    CO2 27 01/09/2024    INR 1.1 10/01/2023    GLUF 100 (H) 01/09/2024       Physical Exam  Vitals and nursing note reviewed.   Constitutional:       Appearance: Normal appearance.   HENT:      Head: Normocephalic.      Nose: Nose normal.   Eyes:      Extraocular Movements: Extraocular movements intact.   Cardiovascular:      Rate and Rhythm: Regular rhythm.      Heart sounds: No murmur heard.  Pulmonary:      Effort: No respiratory distress.      Breath sounds: Normal breath sounds.   Musculoskeletal:      Cervical back: Neck supple.   Skin:     General: Skin is warm.   Neurological:      Mental Status: He is alert and oriented to person, place, and time.      Cranial Nerves: No cranial nerve deficit.   Psychiatric:         Mood and Affect: Mood normal.         Thought Content: Thought content normal.                       Assessment/Plan:   Gabriel was seen today for follow-up.    Diagnoses and all orders for this visit:    Palpitations  -     POCT ECG  -     Ambulatory Referral to Cardiology; Future    Abnormal heart rate  -     POCT ECG  -     Ambulatory Referral to Cardiology; Future    Abnormal EKG  -     Ambulatory Referral to Cardiology; Future    Bifascicular block  -     Ambulatory Referral to Cardiology; Future    History of colonic polyps  -     Ambulatory Referral to Gastroenterology; Future    History of hemorrhoids  -     Ambulatory Referral to Gastroenterology; Future    Screening for colon cancer  -     Ambulatory Referral to  Gastroenterology; Future    Iron deficiency anemia refractory to iron therapy      No follow-ups on file.  There are no Patient Instructions on file for this visit.

## 2024-04-26 LAB
TESTOST FREE SERPL-MCNC: 20.7 PG/ML (ref 6.8–21.5)
TESTOST SERPL-MCNC: 224 NG/DL (ref 264–916)

## 2024-04-29 ENCOUNTER — DOCUMENTATION (OUTPATIENT)
Dept: HEMATOLOGY ONCOLOGY | Facility: CLINIC | Age: 44
End: 2024-04-29

## 2024-05-03 ENCOUNTER — HOSPITAL ENCOUNTER (OUTPATIENT)
Dept: INFUSION CENTER | Facility: CLINIC | Age: 44
Discharge: HOME | End: 2024-05-03
Payer: COMMERCIAL

## 2024-05-03 VITALS
TEMPERATURE: 97.6 F | HEART RATE: 97 BPM | RESPIRATION RATE: 18 BRPM | DIASTOLIC BLOOD PRESSURE: 87 MMHG | SYSTOLIC BLOOD PRESSURE: 143 MMHG

## 2024-05-03 DIAGNOSIS — D50.8 IRON DEFICIENCY ANEMIA REFRACTORY TO IRON THERAPY: Primary | ICD-10-CM

## 2024-05-03 PROCEDURE — 96366 THER/PROPH/DIAG IV INF ADDON: CPT

## 2024-05-03 PROCEDURE — 96365 THER/PROPH/DIAG IV INF INIT: CPT

## 2024-05-03 RX ORDER — SODIUM CHLORIDE 9 MG/ML
20 INJECTION, SOLUTION INTRAVENOUS ONCE
Status: COMPLETED | OUTPATIENT
Start: 2024-05-03 | End: 2024-05-03

## 2024-05-03 RX ORDER — SODIUM CHLORIDE 9 MG/ML
20 INJECTION, SOLUTION INTRAVENOUS ONCE
OUTPATIENT
Start: 2024-05-13

## 2024-05-03 RX ADMIN — IRON SUCROSE 300 MG: 20 INJECTION, SOLUTION INTRAVENOUS at 12:06

## 2024-05-03 RX ADMIN — SODIUM CHLORIDE 20 ML/HR: 0.9 INJECTION, SOLUTION INTRAVENOUS at 12:04

## 2024-05-08 ENCOUNTER — TELEPHONE (OUTPATIENT)
Age: 44
End: 2024-05-08

## 2024-05-08 ENCOUNTER — PREP FOR PROCEDURE (OUTPATIENT)
Age: 44
End: 2024-05-08

## 2024-05-08 ENCOUNTER — PATIENT MESSAGE (OUTPATIENT)
Age: 44
End: 2024-05-08

## 2024-05-08 DIAGNOSIS — Z12.11 SCREENING FOR COLON CANCER: Primary | ICD-10-CM

## 2024-05-08 NOTE — TELEPHONE ENCOUNTER
Scheduled date of colonoscopy (as of today): 6/17/24  Physician performing colonoscopy: Emilie  Location of colonoscopy:   Bowel prep reviewed with patient: chiquis/armando  Instructions reviewed with patient by: laz  Clearances: n/a

## 2024-05-08 NOTE — TELEPHONE ENCOUNTER
05/08/24  Screened by: Zayda Bhardwaj    Referring Provider Dr. Loo     Pre- Screening:     Body mass index is 39.2 kg/m².  Has patient been referred for a routine screening Colonoscopy? yes  Is the patient between 45-75 years old? yes      Previous Colonoscopy yes   If yes:    Date: 2020    Facility: Dr. Phan     Reason:       Does the patient want to see a Gastroenterologist prior to their procedure OR are they having any GI symptoms? no    Has the patient been hospitalized or had abdominal surgery in the past 6 months? no    Does the patient use supplemental oxygen? no    Does the patient take Coumadin, Lovenox, Plavix, Elliquis, Xarelto, or other blood thinning medication? no    Has the patient had a stroke, cardiac event, or stent placed in the past year? no      If patient is between 45yrs - 49yrs, please advise patient that we will have to confirm benefits & coverage with their insurance company for a routine screening colonoscopy.

## 2024-05-13 ENCOUNTER — HOSPITAL ENCOUNTER (OUTPATIENT)
Dept: INFUSION CENTER | Facility: CLINIC | Age: 44
Discharge: HOME | End: 2024-05-13
Payer: COMMERCIAL

## 2024-05-13 VITALS
TEMPERATURE: 98.6 F | HEART RATE: 76 BPM | RESPIRATION RATE: 16 BRPM | SYSTOLIC BLOOD PRESSURE: 136 MMHG | DIASTOLIC BLOOD PRESSURE: 88 MMHG

## 2024-05-13 DIAGNOSIS — D50.8 IRON DEFICIENCY ANEMIA REFRACTORY TO IRON THERAPY: Primary | ICD-10-CM

## 2024-05-13 PROCEDURE — 96365 THER/PROPH/DIAG IV INF INIT: CPT

## 2024-05-13 RX ORDER — SODIUM CHLORIDE 9 MG/ML
20 INJECTION, SOLUTION INTRAVENOUS ONCE
Status: COMPLETED | OUTPATIENT
Start: 2024-05-13 | End: 2024-05-13

## 2024-05-13 RX ORDER — SODIUM CHLORIDE 9 MG/ML
20 INJECTION, SOLUTION INTRAVENOUS ONCE
Status: CANCELLED | OUTPATIENT
Start: 2024-05-20

## 2024-05-13 RX ADMIN — SODIUM CHLORIDE 20 ML/HR: 0.9 INJECTION, SOLUTION INTRAVENOUS at 13:52

## 2024-05-13 RX ADMIN — IRON SUCROSE 300 MG: 20 INJECTION, SOLUTION INTRAVENOUS at 13:53

## 2024-05-13 NOTE — PROGRESS NOTES
Pt tolerated venofer infusion without incident.  Pt declined AVS but is aware of his next appt on May 20 at 1:30.

## 2024-05-20 ENCOUNTER — HOSPITAL ENCOUNTER (OUTPATIENT)
Dept: INFUSION CENTER | Facility: CLINIC | Age: 44
Discharge: HOME | End: 2024-05-20
Payer: COMMERCIAL

## 2024-05-20 VITALS
TEMPERATURE: 97.5 F | SYSTOLIC BLOOD PRESSURE: 126 MMHG | DIASTOLIC BLOOD PRESSURE: 82 MMHG | HEART RATE: 87 BPM | RESPIRATION RATE: 18 BRPM

## 2024-05-20 DIAGNOSIS — D50.8 IRON DEFICIENCY ANEMIA REFRACTORY TO IRON THERAPY: Primary | ICD-10-CM

## 2024-05-20 RX ORDER — SODIUM CHLORIDE 9 MG/ML
20 INJECTION, SOLUTION INTRAVENOUS ONCE
Status: COMPLETED | OUTPATIENT
Start: 2024-05-20 | End: 2024-05-20

## 2024-05-20 RX ORDER — SODIUM CHLORIDE 9 MG/ML
20 INJECTION, SOLUTION INTRAVENOUS ONCE
OUTPATIENT
Start: 2024-06-03

## 2024-05-20 RX ADMIN — SODIUM CHLORIDE 20 ML/HR: 0.9 INJECTION, SOLUTION INTRAVENOUS at 14:04

## 2024-05-20 RX ADMIN — IRON SUCROSE 300 MG: 20 INJECTION, SOLUTION INTRAVENOUS at 14:05

## 2024-05-20 NOTE — PROGRESS NOTES
Patient tolerated treatment without complication. AVS declined, aware of next appointment on 6/3 @ 13:30, patient left unit in stable condition.

## 2024-06-03 ENCOUNTER — HOSPITAL ENCOUNTER (OUTPATIENT)
Dept: INFUSION CENTER | Facility: CLINIC | Age: 44
Discharge: HOME | End: 2024-06-03
Payer: COMMERCIAL

## 2024-06-03 VITALS
HEART RATE: 81 BPM | SYSTOLIC BLOOD PRESSURE: 123 MMHG | TEMPERATURE: 96.7 F | DIASTOLIC BLOOD PRESSURE: 82 MMHG | RESPIRATION RATE: 18 BRPM

## 2024-06-03 DIAGNOSIS — D50.8 IRON DEFICIENCY ANEMIA REFRACTORY TO IRON THERAPY: Primary | ICD-10-CM

## 2024-06-03 RX ORDER — SODIUM CHLORIDE 9 MG/ML
20 INJECTION, SOLUTION INTRAVENOUS ONCE
Status: CANCELLED | OUTPATIENT
Start: 2024-06-03

## 2024-06-03 RX ORDER — SODIUM CHLORIDE 9 MG/ML
20 INJECTION, SOLUTION INTRAVENOUS ONCE
Status: COMPLETED | OUTPATIENT
Start: 2024-06-03 | End: 2024-06-03

## 2024-06-03 RX ADMIN — IRON SUCROSE 300 MG: 20 INJECTION, SOLUTION INTRAVENOUS at 13:55

## 2024-06-03 RX ADMIN — SODIUM CHLORIDE 20 ML/HR: 0.9 INJECTION, SOLUTION INTRAVENOUS at 13:54

## 2024-06-03 NOTE — PLAN OF CARE
Problem: INFECTION - ADULT  Goal: Absence or prevention of progression during hospitalization  Description: INTERVENTIONS:  - Assess and monitor for signs and symptoms of infection  - Monitor lab/diagnostic results  - Monitor all insertion sites, i.e. indwelling lines, tubes, and drains  - Monitor endotracheal if appropriate and nasal secretions for changes in amount and color  - Astor appropriate cooling/warming therapies per order  - Administer medications as ordered  - Instruct and encourage patient and family to use good hand hygiene technique  - Identify and instruct in appropriate isolation precautions for identified infection/condition  Outcome: Progressing

## 2024-06-03 NOTE — PROGRESS NOTES
Patient arrived to the unit and denied complications with previous infusions. Tolerated venofer infusion well without adverse affects. Aware to have blood work done before his next appointment.

## 2024-06-13 DIAGNOSIS — N52.9 ERECTILE DISORDER: ICD-10-CM

## 2024-06-13 DIAGNOSIS — R68.82 LOW LIBIDO: Primary | ICD-10-CM

## 2024-06-17 ENCOUNTER — ANESTHESIA (OUTPATIENT)
Dept: GASTROENTEROLOGY | Facility: HOSPITAL | Age: 44
End: 2024-06-17

## 2024-06-17 ENCOUNTER — HOSPITAL ENCOUNTER (OUTPATIENT)
Dept: GASTROENTEROLOGY | Facility: HOSPITAL | Age: 44
Setting detail: OUTPATIENT SURGERY
Discharge: HOME | End: 2024-06-17
Attending: INTERNAL MEDICINE
Payer: COMMERCIAL

## 2024-06-17 ENCOUNTER — ANESTHESIA EVENT (OUTPATIENT)
Dept: GASTROENTEROLOGY | Facility: HOSPITAL | Age: 44
End: 2024-06-17

## 2024-06-17 VITALS
RESPIRATION RATE: 16 BRPM | SYSTOLIC BLOOD PRESSURE: 124 MMHG | OXYGEN SATURATION: 98 % | DIASTOLIC BLOOD PRESSURE: 70 MMHG | HEART RATE: 52 BPM | TEMPERATURE: 97.2 F

## 2024-06-17 DIAGNOSIS — D50.8 IRON DEFICIENCY ANEMIA REFRACTORY TO IRON THERAPY: Primary | ICD-10-CM

## 2024-06-17 DIAGNOSIS — Z12.11 SCREENING FOR COLON CANCER: ICD-10-CM

## 2024-06-17 PROCEDURE — 45380 COLONOSCOPY AND BIOPSY: CPT | Performed by: INTERNAL MEDICINE

## 2024-06-17 PROCEDURE — 45385 COLONOSCOPY W/LESION REMOVAL: CPT | Performed by: INTERNAL MEDICINE

## 2024-06-17 PROCEDURE — 88305 TISSUE EXAM BY PATHOLOGIST: CPT | Performed by: STUDENT IN AN ORGANIZED HEALTH CARE EDUCATION/TRAINING PROGRAM

## 2024-06-17 RX ORDER — SODIUM CHLORIDE, SODIUM LACTATE, POTASSIUM CHLORIDE, CALCIUM CHLORIDE 600; 310; 30; 20 MG/100ML; MG/100ML; MG/100ML; MG/100ML
125 INJECTION, SOLUTION INTRAVENOUS CONTINUOUS
Status: CANCELLED | OUTPATIENT
Start: 2024-06-17

## 2024-06-17 RX ORDER — PROPOFOL 10 MG/ML
INJECTION, EMULSION INTRAVENOUS CONTINUOUS PRN
Status: DISCONTINUED | OUTPATIENT
Start: 2024-06-17 | End: 2024-06-17

## 2024-06-17 RX ORDER — SODIUM CHLORIDE, SODIUM LACTATE, POTASSIUM CHLORIDE, CALCIUM CHLORIDE 600; 310; 30; 20 MG/100ML; MG/100ML; MG/100ML; MG/100ML
125 INJECTION, SOLUTION INTRAVENOUS CONTINUOUS
Status: DISCONTINUED | OUTPATIENT
Start: 2024-06-17 | End: 2024-06-21 | Stop reason: HOSPADM

## 2024-06-17 RX ORDER — PROPOFOL 10 MG/ML
INJECTION, EMULSION INTRAVENOUS AS NEEDED
Status: DISCONTINUED | OUTPATIENT
Start: 2024-06-17 | End: 2024-06-17

## 2024-06-17 RX ORDER — SODIUM CHLORIDE, SODIUM LACTATE, POTASSIUM CHLORIDE, CALCIUM CHLORIDE 600; 310; 30; 20 MG/100ML; MG/100ML; MG/100ML; MG/100ML
INJECTION, SOLUTION INTRAVENOUS CONTINUOUS PRN
Status: DISCONTINUED | OUTPATIENT
Start: 2024-06-17 | End: 2024-06-17

## 2024-06-17 RX ADMIN — PROPOFOL 100 MG: 10 INJECTION, EMULSION INTRAVENOUS at 08:42

## 2024-06-17 RX ADMIN — PROPOFOL 50 MG: 10 INJECTION, EMULSION INTRAVENOUS at 08:43

## 2024-06-17 RX ADMIN — PROPOFOL 100 MCG/KG/MIN: 10 INJECTION, EMULSION INTRAVENOUS at 08:49

## 2024-06-17 RX ADMIN — PROPOFOL 50 MG: 10 INJECTION, EMULSION INTRAVENOUS at 08:45

## 2024-06-17 RX ADMIN — SODIUM CHLORIDE, SODIUM LACTATE, POTASSIUM CHLORIDE, AND CALCIUM CHLORIDE 125 ML/HR: .6; .31; .03; .02 INJECTION, SOLUTION INTRAVENOUS at 07:58

## 2024-06-17 RX ADMIN — PROPOFOL 50 MG: 10 INJECTION, EMULSION INTRAVENOUS at 09:08

## 2024-06-17 RX ADMIN — SODIUM CHLORIDE, SODIUM LACTATE, POTASSIUM CHLORIDE, AND CALCIUM CHLORIDE: .6; .31; .03; .02 INJECTION, SOLUTION INTRAVENOUS at 07:24

## 2024-06-17 NOTE — ANESTHESIA PREPROCEDURE EVALUATION
Procedure:  COLONOSCOPY    Relevant Problems   GI/HEPATIC   (+) BRBPR (bright red blood per rectum)   (+) Gastroesophageal reflux disease without esophagitis      HEMATOLOGY   (+) Anemia   (+) Iron deficiency anemia refractory to iron therapy             Anesthesia Plan  ASA Score- 2     Anesthesia Type- IV sedation with anesthesia with ASA Monitors.         Additional Monitors:     Airway Plan:     Comment: ECHO 2D COMPLETE  Order: [id number]  Narrative    This result has an attachment that is not available.  ·  Normal echocardiogram.    Left Ventricle  Left ventricle is normal in size. Wall thickness is normal. Systolic function is normal with an ejection fraction of 60-65%. Wall motion is within normal limits. There is normal diastolic function.    Right Ventricle  Right ventricle cavity is normal. Systolic function is normal.    Left Atrium  Left atrium cavity size is normal.    Right Atrium  Right atrium cavity is normal.    IVC/SVC  The inferior vena cava demonstrates a diameter of <=21 mm and collapses >50%; therefore, the right atrial pressure is estimated at 0-5 mmHg.    Mitral Valve  Mitral valve structure is normal. There is no regurgitation or stenosis.    Tricuspid Valve  Tricuspid valve structure is normal. There is trace regurgitation. There is no evidence of tricuspid valve stenosis.    Aortic Valve  The aortic valve is trileaflet. There is no regurgitation or stenosis.    Pulmonic Valve  The pulmonic valve was not well visualized. There is trace regurgitation. There is no evidence of pulmonic valve stenosis.    Ascending Aorta  The aorta appears normal in size.    Pericardium  There is no pericardial effusion.    Study Details  A complete 2D echocardiogram was performed. Color flow, Pulse Wave and Continuous Wave Doppler was performed and analyzed.Overall the study quality was adequate.  All Measurements      .       Plan Factors-Exercise tolerance (METS): >4 METS.    Chart reviewed.   Existing labs  reviewed.     Patient is not a current smoker. Patient not instructed to abstain from smoking on day of procedure. Patient did not smoke on day of surgery.            Induction- intravenous.    Postoperative Plan-         Informed Consent- Anesthetic plan and risks discussed with patient.  I personally reviewed this patient with the CRNA. Discussed and agreed on the Anesthesia Plan with the CRNA..

## 2024-06-17 NOTE — H&P
History and Physical - [facility]  Gabriel Raza 44 y.o. male MRN: [id number]                  HPI: Gabriel Raza is a 44 y.o. year old male who presents for colonoscopy due to history of polyps with previous in  with removal of 4 sub-cm polyps.       REVIEW OF SYSTEMS: Per the HPI, and otherwise unremarkable.    Historical Information   Past Medical History:   Diagnosis Date    Allergic     Since childhood    Anemia     GERD (gastroesophageal reflux disease)     Lipoma     Palpitations     Rib fractures      Past Surgical History:   Procedure Laterality Date    APPENDECTOMY      MOUTH SURGERY      toothextractions    MD EXC B9 LESION MRGN XCP SK TG T/A/L 0.5 CM/< N/A 2016    Procedure: EXCISION SEBACEOUS CYST(X4) RIGHT FLANK, LEFT FLANK; RIGHT AND LEFT LOWER ABDOMEN;  Surgeon: Lexi Richards MD;  Location: [facility];  Service: General     Social History   Social History     Substance and Sexual Activity   Alcohol Use Yes    Comment: Social only     Social History     Substance and Sexual Activity   Drug Use No    Comment: edible marijuana occasionally for training     Social History     Tobacco Use   Smoking Status Former    Current packs/day: 0.00    Average packs/day: 1 pack/day for 15.0 years (15.0 ttl pk-yrs)    Types: Cigarettes    Start date: 1997    Quit date:     Years since quittin.4    Passive exposure: Past   Smokeless Tobacco Never   Tobacco Comments    1 ppd for 12 years, quit about in      Family History   Problem Relation Age of Onset    Diabetes Father     Alcohol abuse Father     Diabetes Cousin     Leukemia Cousin     Cancer Cousin     Diabetes Brother     Thyroid disease Maternal Aunt     Cancer Maternal Grandmother        Meds/Allergies       Current Outpatient Medications:     Cetirizine HCl (ZYRTEC ALLERGY PO)    ergocalciferol (VITAMIN D2) 50,000 units    ferrous gluconate (Iron 27) 240 (27 FE) MG tablet    Multiple Vitamins-Minerals  (MULTIVITAMIN PO)    Omega-3 Fatty Acids (FISH OIL PO)    Current Facility-Administered Medications:     lactated ringers infusion, 125 mL/hr, Intravenous, Continuous, 125 mL/hr at 06/17/24 0758    Facility-Administered Medications Ordered in Other Encounters:     lactated ringers infusion, , Intravenous, Continuous PRN, New Bag at 06/17/24 0724    Allergies   Allergen Reactions    Nuts - Food Allergy Throat Swelling     Only when eating just nuts, doesn't react all the time    Other Cough and Other (See Comments)     mild    Seasonal Ic  [Cholestatin]        Objective     /80   Pulse 78   Temp (!) 97.3 °F (36.3 °C) (Temporal)   Resp 15   SpO2 99%       PHYSICAL EXAM    Gen: NAD  Head: NCAT  CV: RRR  CHEST: Clear  ABD: soft, NT/ND  EXT: no edema      ASSESSMENT/PLAN:  This is a 44 y.o. year old male here for colonoscopy, and he is stable and optimized for his procedure.

## 2024-06-17 NOTE — ANESTHESIA POSTPROCEDURE EVALUATION
Post-Op Assessment Note    CV Status:  Stable  Pain Score: 0    Pain management: adequate       Mental Status:  Alert   Hydration Status:  Stable   PONV Controlled:  Controlled   Airway Patency:  Patent     Post Op Vitals Reviewed: Yes    No anethesia notable event occurred.    Staff: CRNA               BP   133/91   Temp      Pulse  67   Resp   20   SpO2   98

## 2024-06-18 ENCOUNTER — TELEPHONE (OUTPATIENT)
Dept: FAMILY MEDICINE CLINIC | Facility: CLINIC | Age: 44
End: 2024-06-18

## 2024-06-18 DIAGNOSIS — A04.8 HELICOBACTER PYLORI INFECTION: Primary | ICD-10-CM

## 2024-06-18 NOTE — TELEPHONE ENCOUNTER
Message  Received: Yesterday  Tressa Li  [facility]  Please send treatment, patient is aware of all instruction for treatment and stool testing after 2 weeks of completing treatment. Pharmacy [facility] on [address]. Thank you          Previous Messages       ----- Message -----  From: Miguel Phan MD  Sent: 6/17/2024  12:15 PM EDT  To: Tressa Li    Patient has history of iron deficient anemia likely secondary to H. pylori.  Patient never had treatment for this can we please prescribe patient quadruple therapy.  Repeat stool studies in 2 to 3 weeks after completion of the therapy.  Patient can also get blood work 1 month from now regarding CBC, iron studies and celiac disease serologies.

## 2024-06-18 NOTE — TELEPHONE ENCOUNTER
FFN was negative 3/29/2017. Baby active. Pressure and some contractions but no regular ones. FM, PTL signs reviewed. Follow up two weeks. Slight anemia consistent with pregnancy and normal one hour on lab.    Patient with hx of H-Pylori 2020.Patient states he never took any antibiotics.Quad therapy ordered per Dr Phan.Please sign and send medications to the pharmacy.

## 2024-06-18 NOTE — TELEPHONE ENCOUNTER
Called and spoke with patient.Informed him of recommendations.Instructions given for Quad therapy.Medications queued up for provider.Patient verbalized understanding of all instructions for H-Pylori treatment,stool study and blood work.

## 2024-06-19 RX ORDER — TETRACYCLINE HYDROCHLORIDE 500 MG/1
CAPSULE ORAL
Qty: 56 CAPSULE | Refills: 0 | Status: SHIPPED | OUTPATIENT
Start: 2024-06-19 | End: 2024-07-02

## 2024-06-19 RX ORDER — PANTOPRAZOLE SODIUM 40 MG/1
TABLET, DELAYED RELEASE ORAL
Qty: 28 TABLET | Refills: 0 | Status: SHIPPED | OUTPATIENT
Start: 2024-06-19

## 2024-06-19 RX ORDER — METRONIDAZOLE 250 MG/1
TABLET ORAL
Qty: 56 TABLET | Refills: 0 | Status: SHIPPED | OUTPATIENT
Start: 2024-06-19 | End: 2024-07-02

## 2024-06-19 RX ORDER — BISMUTH SUBSALICYLATE 262 MG/1
TABLET, CHEWABLE ORAL
Qty: 56 TABLET | Refills: 0 | Status: SHIPPED | OUTPATIENT
Start: 2024-06-19

## 2024-06-20 PROCEDURE — 88305 TISSUE EXAM BY PATHOLOGIST: CPT | Performed by: STUDENT IN AN ORGANIZED HEALTH CARE EDUCATION/TRAINING PROGRAM

## 2024-06-28 ENCOUNTER — CONSULT (OUTPATIENT)
Dept: CARDIOLOGY CLINIC | Facility: CLINIC | Age: 44
End: 2024-06-28
Payer: COMMERCIAL

## 2024-06-28 VITALS
SYSTOLIC BLOOD PRESSURE: 122 MMHG | BODY MASS INDEX: 37.44 KG/M2 | DIASTOLIC BLOOD PRESSURE: 80 MMHG | HEART RATE: 61 BPM | HEIGHT: 68 IN | WEIGHT: 247 LBS

## 2024-06-28 DIAGNOSIS — R00.2 PALPITATIONS: Primary | ICD-10-CM

## 2024-06-28 DIAGNOSIS — R00.9 ABNORMAL HEART RATE: ICD-10-CM

## 2024-06-28 DIAGNOSIS — R94.31 ABNORMAL EKG: ICD-10-CM

## 2024-06-28 DIAGNOSIS — I45.2 BIFASCICULAR BLOCK: ICD-10-CM

## 2024-06-28 PROCEDURE — 93000 ELECTROCARDIOGRAM COMPLETE: CPT

## 2024-06-28 PROCEDURE — 99204 OFFICE O/P NEW MOD 45 MIN: CPT
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
## 2024-08-02 ENCOUNTER — TELEPHONE (OUTPATIENT)
Dept: CARDIOLOGY CLINIC | Facility: CLINIC | Age: 44
End: 2024-08-02

## 2024-08-02 NOTE — TELEPHONE ENCOUNTER
CTS Can Discuss.    Left message patient voice.  Dr. Alejandro ordered a Zio Monitor at last office visit 6/24/24.  Have you received the monitor.  Did you wear the monitor.  If you wore the monitor, return the monitor to [facility] to have report downloaded for Dr. Alejandro to review.

## 2024-08-23 ENCOUNTER — TELEPHONE (OUTPATIENT)
Dept: HEMATOLOGY ONCOLOGY | Facility: CLINIC | Age: 44
End: 2024-08-23

## 2024-08-23 ENCOUNTER — APPOINTMENT (OUTPATIENT)
Dept: LAB | Facility: CLINIC | Age: 44
End: 2024-08-23
Payer: COMMERCIAL

## 2024-08-23 DIAGNOSIS — D50.8 IRON DEFICIENCY ANEMIA REFRACTORY TO IRON THERAPY: ICD-10-CM

## 2024-08-23 DIAGNOSIS — N52.9 ERECTILE DISORDER: ICD-10-CM

## 2024-08-23 DIAGNOSIS — R68.82 LOW LIBIDO: ICD-10-CM

## 2024-08-23 LAB
BASOPHILS # BLD AUTO: 0.05 THOUSANDS/ÂΜL (ref 0–0.1)
BASOPHILS NFR BLD AUTO: 1 % (ref 0–1)
EOSINOPHIL # BLD AUTO: 0.11 THOUSAND/ÂΜL (ref 0–0.61)
EOSINOPHIL NFR BLD AUTO: 3 % (ref 0–6)
ERYTHROCYTE [DISTWIDTH] IN BLOOD BY AUTOMATED COUNT: 15 % (ref 11.6–15.1)
FERRITIN SERPL-MCNC: 7 NG/ML (ref 24–336)
GLIADIN PEPTIDE IGA SER-ACNC: 0.2 U/ML
GLIADIN PEPTIDE IGA SER-ACNC: NEGATIVE
GLIADIN PEPTIDE IGG SER-ACNC: <0.4 U/ML
GLIADIN PEPTIDE IGG SER-ACNC: NEGATIVE
HCT VFR BLD AUTO: 43 % (ref 36.5–49.3)
HGB BLD-MCNC: 13.8 G/DL (ref 12–17)
IGA SERPL-MCNC: 143 MG/DL (ref 66–433)
IMM GRANULOCYTES # BLD AUTO: 0.01 THOUSAND/UL (ref 0–0.2)
IMM GRANULOCYTES NFR BLD AUTO: 0 % (ref 0–2)
IRON SATN MFR SERPL: 9 % (ref 15–50)
IRON SERPL-MCNC: 36 UG/DL (ref 50–212)
LYMPHOCYTES # BLD AUTO: 1.18 THOUSANDS/ÂΜL (ref 0.6–4.47)
LYMPHOCYTES NFR BLD AUTO: 29 % (ref 14–44)
MCH RBC QN AUTO: 26 PG (ref 26.8–34.3)
MCHC RBC AUTO-ENTMCNC: 32.1 G/DL (ref 31.4–37.4)
MCV RBC AUTO: 81 FL (ref 82–98)
MONOCYTES # BLD AUTO: 0.45 THOUSAND/ÂΜL (ref 0.17–1.22)
MONOCYTES NFR BLD AUTO: 11 % (ref 4–12)
NEUTROPHILS # BLD AUTO: 2.3 THOUSANDS/ÂΜL (ref 1.85–7.62)
NEUTS SEG NFR BLD AUTO: 56 % (ref 43–75)
NRBC BLD AUTO-RTO: 0 /100 WBCS
PLATELET # BLD AUTO: 165 THOUSANDS/UL (ref 149–390)
RBC # BLD AUTO: 5.31 MILLION/UL (ref 3.88–5.62)
TIBC SERPL-MCNC: 417 UG/DL (ref 250–450)
TTG IGA SER-ACNC: <0.5 U/ML
TTG IGA SER-ACNC: NEGATIVE
TTG IGG SER-ACNC: <0.8 U/ML
TTG IGG SER-ACNC: NEGATIVE
UIBC SERPL-MCNC: 381 UG/DL (ref 155–355)
WBC # BLD AUTO: 4.1 THOUSAND/UL (ref 4.31–10.16)

## 2024-08-23 PROCEDURE — 86364 TISS TRNSGLTMNASE EA IG CLAS: CPT

## 2024-08-23 PROCEDURE — 85025 COMPLETE CBC W/AUTO DIFF WBC: CPT

## 2024-08-23 PROCEDURE — 83540 ASSAY OF IRON: CPT

## 2024-08-23 PROCEDURE — 86258 DGP ANTIBODY EACH IG CLASS: CPT

## 2024-08-23 PROCEDURE — 83550 IRON BINDING TEST: CPT

## 2024-08-23 PROCEDURE — 36415 COLL VENOUS BLD VENIPUNCTURE: CPT

## 2024-08-23 PROCEDURE — 82728 ASSAY OF FERRITIN: CPT

## 2024-08-23 PROCEDURE — 84402 ASSAY OF FREE TESTOSTERONE: CPT

## 2024-08-23 PROCEDURE — 84403 ASSAY OF TOTAL TESTOSTERONE: CPT

## 2024-08-23 PROCEDURE — 82784 ASSAY IGA/IGD/IGG/IGM EACH: CPT

## 2024-08-23 NOTE — TELEPHONE ENCOUNTER
Called to set up follow up appointment with Krystal Laboy.  Notified patient via voicemail next appointment time is September 11 at 10:20 at the [place] office.  Advised patient Roel did state visit could be virtual if desired.  Advised patient to return call if he does want virtual visit instead of in person.  [employer] number provided.

## 2024-08-26 LAB
TESTOST FREE SERPL-MCNC: 18.2 PG/ML (ref 6.8–21.5)
TESTOST SERPL-MCNC: 212 NG/DL (ref 264–916)

## 2024-09-03 ENCOUNTER — OFFICE VISIT (OUTPATIENT)
Dept: FAMILY MEDICINE CLINIC | Facility: CLINIC | Age: 44
End: 2024-09-03
Payer: COMMERCIAL

## 2024-09-03 VITALS
TEMPERATURE: 96 F | BODY MASS INDEX: 38.55 KG/M2 | DIASTOLIC BLOOD PRESSURE: 78 MMHG | SYSTOLIC BLOOD PRESSURE: 110 MMHG | HEART RATE: 88 BPM | HEIGHT: 68 IN | WEIGHT: 254.4 LBS | OXYGEN SATURATION: 98 %

## 2024-09-03 DIAGNOSIS — R68.82 LOW LIBIDO: ICD-10-CM

## 2024-09-03 DIAGNOSIS — R79.89 LOW TESTOSTERONE: Primary | ICD-10-CM

## 2024-09-03 DIAGNOSIS — D50.8 IRON DEFICIENCY ANEMIA REFRACTORY TO IRON THERAPY: ICD-10-CM

## 2024-09-03 DIAGNOSIS — R00.2 PALPITATIONS: ICD-10-CM

## 2024-09-03 DIAGNOSIS — K62.5 BRBPR (BRIGHT RED BLOOD PER RECTUM): ICD-10-CM

## 2024-09-03 PROCEDURE — 99214 OFFICE O/P EST MOD 30 MIN: CPT | Performed by: FAMILY MEDICINE

## 2024-09-03 NOTE — PROGRESS NOTES
Subjective:    ARIA Rios is a 44 y.o. male here today for:  Chief Complaint   Patient presents with    Follow-up   .      ---Above per clinical staff & reviewed. ---  HPI:  [id number] here for follow up  Had testosterone retested and still low  Discussed injection vs androgel-  Pt more interested in natural ways to increase  Recommended health food store in [place] and also to make sure supplements are 3rd party tested  GI symptoms checked and had colonoscopy  Still has bright red blood in toilet bowl at times , does have hemorrhoid  Stopped supplements and vitamins  Feeling better  Seen by cardiology and did monitor- results not back yet  Doing exercises for vagus nerve which has helped  Well exam due in january    The following portions of the patient's history were reviewed and updated as appropriate: allergies, current medications, past family history, past medical history, past social history, past surgical history and problem list.    Past Medical History:   Diagnosis Date    Allergic     Since childhood    Anemia     GERD (gastroesophageal reflux disease)     Lipoma     Palpitations     Rib fractures        Past Surgical History:   Procedure Laterality Date    APPENDECTOMY      MOUTH SURGERY      toothextractions    RI EXC B9 LESION MRGN XCP SK TG T/A/L 0.5 CM/< N/A 2016    Procedure: EXCISION SEBACEOUS CYST(X4) RIGHT FLANK, LEFT FLANK; RIGHT AND LEFT LOWER ABDOMEN;  Surgeon: Lexi Richards MD;  Location: [facility] OR;  Service: General       Social History     Socioeconomic History    Marital status: /Civil Union     Spouse name: None    Number of children: None    Years of education: None    Highest education level: None   Occupational History    None   Tobacco Use    Smoking status: Former     Current packs/day: 0.00     Average packs/day: 1 pack/day for 15.0 years (15.0 ttl pk-yrs)     Types: Cigarettes     Start date: 1997     Quit date:      Years since quittin.6     Passive  exposure: Past    Smokeless tobacco: Never    Tobacco comments:     1 ppd for 12 years, quit about in 2012   Vaping Use    Vaping status: Never Used   Substance and Sexual Activity    Alcohol use: Yes     Comment: Social only    Drug use: No     Comment: edible marijuana occasionally for training    Sexual activity: Yes     Partners: Female     Comment: Wife is on birthcontrol   Other Topics Concern    None   Social History Narrative    None     Social Determinants of Health     Financial Resource Strain: Low Risk  (10/1/2023)    [facility]    Overall Financial Resource Strain (CARDIA)     Difficulty of Paying Living Expenses: Not hard at all   Food Insecurity: No Food Insecurity (10/1/2023)    [facility], [facility]    Hunger Vital Sign     Worried About Running Out of Food in the Last Year: Never true     Ran Out of Food in the Last Year: Never true   Transportation Needs: No Transportation Needs (10/1/2023)    [facility] from [facility], [facility]    PRAPARE - Transportation     Lack of Transportation (Medical): No     Lack of Transportation (Non-Medical): No   Physical Activity: Not on file   Stress: Stress Concern Present (9/15/2021)    [facility] from [facility], [facility]    [employer] - Occupational Stress Questionnaire     Feeling of Stress : To some extent   Social Connections: Moderately Isolated (9/15/2021)    Received from [facility], [facility]    Social Connection and Isolation Panel [NHANES]     Frequency of Communication with Friends and Family: More than three times a week     Frequency of Social Gatherings with Friends and Family: Once a week     Attends Hindu Services: Never     Active Member of Clubs or Organizations: No     Attends Club or  "Organization Meetings: Never     Marital Status:    Intimate Partner Violence: Not At Risk (10/1/2023)    [facility]    Humiliation, Afraid, Rape, and Kick questionnaire     Fear of Current or Ex-Partner: No     Emotionally Abused: No     Physically Abused: No     Sexually Abused: No   Housing Stability: Low Risk  (10/1/2023)    [facility]    Housing Stability Vital Sign     Unable to Pay for Housing in the Last Year: No     Number of Places Lived in the Last Year: 1     Unstable Housing in the Last Year: No       Current Outpatient Medications   Medication Sig Dispense Refill    bismuth subsalicylate (PEPTO BISMOL) 262 MG chewable tablet Take 1 tablet by mouth every 6 hours for 14 days 56 tablet 0    Cetirizine HCl (ZYRTEC ALLERGY PO) Take by mouth.       No current facility-administered medications for this visit.        Review of Systems   Constitutional:  Positive for fatigue. Negative for chills and fever.   HENT: Negative.  Negative for ear pain and sore throat.    Eyes:  Negative for pain and visual disturbance.   Respiratory: Negative.  Negative for cough and shortness of breath.    Cardiovascular: Negative.  Negative for chest pain and palpitations.   Gastrointestinal:  Positive for anal bleeding. Negative for abdominal pain and vomiting.   Genitourinary: Negative.  Negative for dysuria and hematuria.   Musculoskeletal: Negative.  Negative for arthralgias and back pain.   Skin: Negative.  Negative for color change and rash.   Neurological: Negative.  Negative for seizures and syncope.   Psychiatric/Behavioral: Negative.     All other systems reviewed and are negative.       Objective:    /78 (BP Location: Left arm, Patient Position: Sitting, Cuff Size: Large)   Pulse 88   Temp (!) 96 °F (35.6 °C) (Temporal)   Ht 5' 8\" (1.727 m)   Wt 115 kg (254 lb 6.4 oz)   SpO2 98%  "  BMI 38.68 kg/m²   Wt Readings from Last 3 Encounters:   09/03/24 115 kg (254 lb 6.4 oz)   06/28/24 112 kg (247 lb)   04/25/24 117 kg (257 lb 12.8 oz)     BP Readings from Last 3 Encounters:   09/03/24 110/78   06/28/24 122/80   06/17/24 124/70       Lab Results   Component Value Date    WBC 4.10 (L) 08/23/2024    HGB 13.8 08/23/2024    HCT 43.0 08/23/2024     08/23/2024    TRIG 124 01/09/2024    HDL 39 (L) 01/09/2024    ALT 28 01/09/2024    AST 25 01/09/2024    K 4.1 01/09/2024     01/09/2024    CREATININE 0.69 01/09/2024    BUN 14 01/09/2024    CO2 27 01/09/2024    INR 1.1 10/01/2023    GLUF 100 (H) 01/09/2024       Physical Exam  Vitals and nursing note reviewed.   Constitutional:       Appearance: Normal appearance.   HENT:      Head: Normocephalic and atraumatic.      Nose: Nose normal.   Eyes:      Extraocular Movements: Extraocular movements intact.      Pupils: Pupils are equal, round, and reactive to light.   Cardiovascular:      Rate and Rhythm: Normal rate and regular rhythm.      Heart sounds: No murmur heard.  Pulmonary:      Effort: Pulmonary effort is normal. No respiratory distress.      Breath sounds: Normal breath sounds.   Musculoskeletal:      Cervical back: Neck supple.   Skin:     General: Skin is warm.   Neurological:      General: No focal deficit present.      Mental Status: He is alert and oriented to person, place, and time.      Cranial Nerves: No cranial nerve deficit.   Psychiatric:         Mood and Affect: Mood normal.         Behavior: Behavior normal.         Thought Content: Thought content normal.                       Assessment/Plan:   Gabriel was seen today for follow-up.    Diagnoses and all orders for this visit:    Low testosterone    Low libido    Palpitations    BRBPR (bright red blood per rectum)    Iron deficiency anemia refractory to iron therapy      Return in about 4 months (around 1/12/2025) for Annual physical.  There are no Patient Instructions on  file for this visit.

## 2024-09-11 ENCOUNTER — OFFICE VISIT (OUTPATIENT)
Dept: HEMATOLOGY ONCOLOGY | Facility: CLINIC | Age: 44
End: 2024-09-11
Payer: COMMERCIAL

## 2024-09-11 VITALS
BODY MASS INDEX: 38.27 KG/M2 | OXYGEN SATURATION: 98 % | TEMPERATURE: 97.2 F | SYSTOLIC BLOOD PRESSURE: 128 MMHG | WEIGHT: 252.5 LBS | HEART RATE: 90 BPM | HEIGHT: 68 IN | RESPIRATION RATE: 18 BRPM | DIASTOLIC BLOOD PRESSURE: 78 MMHG

## 2024-09-11 DIAGNOSIS — K64.4 EXTERNAL HEMORRHOID, BLEEDING: Primary | ICD-10-CM

## 2024-09-11 PROCEDURE — 99213 OFFICE O/P EST LOW 20 MIN: CPT | Performed by: PHYSICIAN ASSISTANT

## 2024-09-11 RX ORDER — MULTIVITAMIN
1 TABLET ORAL DAILY
COMMUNITY

## 2024-09-11 NOTE — PROGRESS NOTES
Hematology/Oncology Outpatient Follow-up  Gabriel Raza 44 y.o. male 1980 [id number]    Date:  9/11/2024      Assessment and Plan:  1. External hemorrhoid, bleeding  Patient is not symptomatic from his low ferritin at this time   No anemia at this time   Continues with rectal bleeding. He would like to discuss tx of this as likely this is underlying cause.   Referral made for Dr. Acuna in [place].   Patient would like to continue f/u with PCP for monitoring of labs.   Follow up her PRN.    - Ambulatory referral to Colorectal Surgery; Future     HPI:  44-year-old male presents for follow-up visit.  He was last seen in August 2020.  Prior to that he was seen in April 2020.  He presented due to iron deficiency with anemia.     History of hemorrhoids.  He underwent an EGD, colonoscopy, capsule endoscopy in September 2020.     This showed a normal esophagus, stomach, duodenum.  Biopsies were taken which were negative for celiac but positive for H. pylori.  Patient states he thinks that he did complete the treatment for H. pylori but chart indicates that he did not complete follow-up H. pylori stool testing.     Colonoscopy showed 4 polyps which were removed.  A repeat colonoscopy was recommended in 3 years as well as a high-fiber diet and increasing fluid intake.     Capsule endoscopy showed small erosion in the small bowel, no active bleeding noted     Is back to the office today due to persistent iron deficiency anemia.    Interval history:  Last seen in April 2024   Venofer 200 mg completed on 6/3/24     ROS: Review of Systems   Constitutional:  Negative for appetite change, chills, fatigue, fever and unexpected weight change.   HENT:  Negative for mouth sores and nosebleeds.    Respiratory:  Negative for cough and shortness of breath.    Cardiovascular:  Negative for chest pain, palpitations and leg swelling.   Gastrointestinal:  Negative for abdominal pain, constipation, diarrhea, nausea and vomiting.         Hemorrhoidal bleeding    Genitourinary:  Negative for difficulty urinating, dysuria and hematuria.   Musculoskeletal:  Negative for arthralgias.   Skin: Negative.    Neurological:  Negative for dizziness, weakness, light-headedness, numbness and headaches.   Hematological: Negative.    Psychiatric/Behavioral: Negative.         Past Medical History:   Diagnosis Date    Allergic     Since childhood    Anemia     GERD (gastroesophageal reflux disease)     Lipoma     Palpitations     Rib fractures        Past Surgical History:   Procedure Laterality Date    APPENDECTOMY      MOUTH SURGERY      toothextractions    ME EXC B9 LESION MRGN XCP SK TG T/A/L 0.5 CM/< N/A 2016    Procedure: EXCISION SEBACEOUS CYST(X4) RIGHT FLANK, LEFT FLANK; RIGHT AND LEFT LOWER ABDOMEN;  Surgeon: Lexi Richards MD;  Location: [facility];  Service: General       Social History     Socioeconomic History    Marital status: /Civil Union     Spouse name: None    Number of children: None    Years of education: None    Highest education level: None   Occupational History    None   Tobacco Use    Smoking status: Former     Current packs/day: 0.00     Average packs/day: 1 pack/day for 15.0 years (15.0 ttl pk-yrs)     Types: Cigarettes     Start date: 1997     Quit date:      Years since quittin.7     Passive exposure: Past    Smokeless tobacco: Never    Tobacco comments:     1 ppd for 12 years, quit about in    Vaping Use    Vaping status: Never Used   Substance and Sexual Activity    Alcohol use: Yes     Comment: Social only    Drug use: No     Comment: edible marijuana occasionally for training    Sexual activity: Yes     Partners: Female     Comment: Wife is on birthcontrol   Other Topics Concern    None   Social History Narrative    None     Social Determinants of Health     Financial Resource Strain: Low Risk  (10/1/2023)    [facility]    Overall  Financial Resource Strain (CARDIA)     Difficulty of Paying Living Expenses: Not hard at all   Food Insecurity: No Food Insecurity (10/1/2023)    [facility]    Hunger Vital Sign     Worried About Running Out of Food in the Last Year: Never true     Ran Out of Food in the Last Year: Never true   Transportation Needs: No Transportation Needs (10/1/2023)    [facility]    PRAPARE - Transportation     Lack of Transportation (Medical): No     Lack of Transportation (Non-Medical): No   Physical Activity: Not on file   Stress: Stress Concern Present (9/15/2021)    [facility] from [facility]    [employer] - Occupational Stress Questionnaire     Feeling of Stress : To some extent   Social Connections: Moderately Isolated (9/15/2021)    [facility] from [facility]    Social Connection and Isolation Panel [NHANES]     Frequency of Communication with Friends and Family: More than three times a week     Frequency of Social Gatherings with Friends and Family: Once a week     Attends Protestant Services: Never     Active Member of Clubs or Organizations: No     Attends Club or Organization Meetings: Never     Marital Status:    Intimate Partner Violence: Not At Risk (10/1/2023)    [facility]    Humiliation, Afraid, Rape, and Kick questionnaire     Fear of Current or Ex-Partner: No     Emotionally Abused: No     Physically Abused: No     Sexually Abused: No   Housing Stability: Low Risk  (10/1/2023)    [facility]    Housing Stability Vital Sign     Unable to Pay for Housing in the Last Year: No     Number of Places Lived in the Last Year: 1     Unstable Housing in  "the Last Year: No       Family History   Problem Relation Age of Onset    Diabetes Father     Alcohol abuse Father     Diabetes Cousin     Leukemia Cousin     Cancer Cousin     Diabetes Brother     Thyroid disease Maternal Aunt     Cancer Maternal Grandmother        Allergies   Allergen Reactions    Nuts - Food Allergy Throat Swelling     Only when eating just nuts, doesn't react all the time    Other Cough and Other (See Comments)     mild         Current Outpatient Medications:     bismuth subsalicylate (PEPTO BISMOL) 262 MG chewable tablet, Take 1 tablet by mouth every 6 hours for 14 days, Disp: 56 tablet, Rfl: 0    Cetirizine HCl (ZYRTEC ALLERGY PO), Take by mouth., Disp: , Rfl:     Multiple Vitamin (multivitamin) tablet, Take 1 tablet by mouth daily, Disp: , Rfl:     Physical Exam:  /78 (BP Location: Left arm, Patient Position: Sitting, Cuff Size: Large)   Pulse 90   Temp (!) 97.2 °F (36.2 °C) (Temporal)   Resp 18   Ht 5' 8\" (1.727 m)   Wt 115 kg (252 lb 8 oz)   SpO2 98%   BMI 38.39 kg/m²     Physical Exam  Vitals reviewed.   Constitutional:       General: He is not in acute distress.     Appearance: He is well-developed. He is not ill-appearing.   HENT:      Head: Normocephalic and atraumatic.   Eyes:      General: No scleral icterus.     Conjunctiva/sclera: Conjunctivae normal.   Cardiovascular:      Rate and Rhythm: Normal rate and regular rhythm.      Heart sounds: Normal heart sounds. No murmur heard.  Pulmonary:      Effort: Pulmonary effort is normal. No respiratory distress.      Breath sounds: Normal breath sounds.   Abdominal:      Palpations: Abdomen is soft.      Tenderness: There is no abdominal tenderness.   Musculoskeletal:         General: No tenderness. Normal range of motion.      Cervical back: Normal range of motion and neck supple.      Right lower leg: No edema.      Left lower leg: No edema.   Lymphadenopathy:      Cervical: No cervical adenopathy.   Skin:     General: Skin " is warm and dry.   Neurological:      Mental Status: He is alert and oriented to person, place, and time.      Cranial Nerves: No cranial nerve deficit.   Psychiatric:         Mood and Affect: Mood normal.         Behavior: Behavior normal.       Labs:  Lab Results   Component Value Date    WBC 4.10 (L) 08/23/2024    HGB 13.8 08/23/2024    HCT 43.0 08/23/2024    MCV 81 (L) 08/23/2024     08/23/2024     I have spent 20 minutes with Patient  today in which greater than 50% of this time was spent in counseling/coordination of care regarding Diagnostic results, Risks and benefits of tx options, Instructions for management, Impressions, Documenting in the medical record, Reviewing / ordering tests, medicine, procedures  , and Obtaining or reviewing history  .     Patient voiced understanding and agreement in the above discussion. Aware to contact our office with questions/symptoms in the interim.     This note has been generated by voice recognition software system.  Therefore, there may be spelling, grammar, and or syntax errors. Please contact if questions arise.

## 2024-09-17 ENCOUNTER — OFFICE VISIT (OUTPATIENT)
Dept: PHYSICAL THERAPY | Facility: CLINIC | Age: 44
End: 2024-09-17
Payer: COMMERCIAL

## 2024-09-17 DIAGNOSIS — M79.672 HEEL PAIN, BILATERAL: Primary | ICD-10-CM

## 2024-09-17 DIAGNOSIS — M79.671 HEEL PAIN, BILATERAL: Primary | ICD-10-CM

## 2024-09-17 PROCEDURE — 97162 PT EVAL MOD COMPLEX 30 MIN: CPT | Performed by: PHYSICAL THERAPIST

## 2024-09-17 PROCEDURE — 97112 NEUROMUSCULAR REEDUCATION: CPT | Performed by: PHYSICAL THERAPIST

## 2024-09-17 NOTE — PROGRESS NOTES
PT Evaluation     Today's date: 2024  Patient name: Gabriel Raza  : 1980  MRN: [id number]  Referring provider: Silvio Smith, PT  Dx:   Encounter Diagnosis     ICD-10-CM    1. Heel pain, bilateral  M79.671     M79.672           Start Time: 1445  Stop Time: 1530  Total time in clinic (min): 45 minutes    Assessment  Impairments: abnormal muscle tone, abnormal movement, activity intolerance, lacks appropriate home exercise program, pain with function, weight-bearing intolerance, participation limitations and activity limitations  Symptom irritability: moderate    Assessment details: 44 year old male patient reports to PT with acute B heel pain. No red flags noted and patient denies numbness/tingling. Patient presents with signs/symptoms correlating with plantar fasciitis. Suspicion for lumbar involvement low at time of evaluation as slump negative. Patient primary movement impairments related to his symptoms are decreased ankle and foot flexibility/mobility. Patient will benefit from skilled PT services to address current impairments and functional limitations to help patient return to his PLOF.       Understanding of Dx/Px/POC: good     Prognosis: good    Goals  STG  1. Patient will be independent with completion of HEP throughout therapy  2. Patient will have at worst 4/10 pain so patient can get out of bed with less discomfort in 3 weeks.  LTG  1. Patient will ambulate for prolonged periods without increase in symptoms so patient can navigate throughout the community with less difficulty in 6 weeks.   2. Patient will partake in Taekwondo without increase in difficulty or symptoms in 6 weeks.     Plan  Patient would benefit from: skilled physical therapy    Planned therapy interventions: abdominal trunk stabilization, activity modification, balance, kinesiology taping, joint mobilization, IASTM, manual therapy, motor coordination training, neuromuscular re-education, nerve gliding,  patient/caregiver education, flexibility, functional ROM exercises, gait training, home exercise program, therapeutic exercise, therapeutic activities, stretching and strengthening    Frequency: 2x week  Duration in weeks: 4  Treatment plan discussed with: patient        Subjective Evaluation    History of Present Illness  Mechanism of injury: Patient with B plantar fasciitis (L>R). Patient notes if he wears negative drop sneakers all day and stretches his symptoms feels better. Patient denies numbness/tingling. Patient notes this has been going on for almost a month. Patient notes when he wakes up and gets out of bed his symptoms are provoked again. Patient's pain is in his medial heel.     Patient symptoms aggravated when goes from flat stance to balls of feet during Taekwondo.  Patient Goals  Patient goals for therapy: decreased pain, increased motion, increased strength and return to sport/leisure activities    Pain  Current pain ratin  At best pain ratin  At worst pain ratin  Quality: dull ache and sharp  Relieving factors: change in position, relaxation and medications  Aggravating factors: walking and standing    Treatments  Current treatment: physical therapy        Objective     Passive Range of Motion   Left Ankle/Foot    Plantar flexion: WFL  Inversion: WFL  Eversion: WFL    Right Ankle/Foot    Plantar flexion: WFL  Inversion: WFL  Eversion: WFL    Additional Passive Range of Motion Details  Decreased ankle DF flexibility B (L>R)   -increased L ankle talocrural joint tightness     Joint Play   Left Ankle/Foot  Hypomobile in the talocrural joint, subtalar joint and forefoot.     Right Ankle/Foot  Hypomobile in the subtalar joint.     Comments  Left subtalar joint comments: >R  .      Strength/Myotome Testing     Left Ankle/Foot   Dorsiflexion: 4-  Plantar flexion: 4  Inversion: 4-  Eversion: 4-    Right Ankle/Foot   Dorsiflexion: 4-  Plantar flexion: 4  Inversion: 4-  Eversion: 4-              Precautions: none      Manuals 9/17            B ankle mobs             B subtalar mobs             L forefoot mob             EPAT             B calf/plantar fascia mx work             Neuro Re-Ed             Toe intrinsic circuit                                                                                           Ther Ex             Calf stretch r            Functional soleus stretch r            Calf raises r            Self calcaneal mob r                         bike             Ankle 4way                          Ther Activity                                       Gait Training                                       Modalities

## 2024-09-19 ENCOUNTER — OFFICE VISIT (OUTPATIENT)
Dept: PHYSICAL THERAPY | Facility: CLINIC | Age: 44
End: 2024-09-19
Payer: COMMERCIAL

## 2024-09-19 DIAGNOSIS — M79.672 HEEL PAIN, BILATERAL: Primary | ICD-10-CM

## 2024-09-19 DIAGNOSIS — M79.671 HEEL PAIN, BILATERAL: Primary | ICD-10-CM

## 2024-09-19 PROCEDURE — 97110 THERAPEUTIC EXERCISES: CPT

## 2024-09-19 PROCEDURE — 97140 MANUAL THERAPY 1/> REGIONS: CPT

## 2024-09-19 NOTE — PROGRESS NOTES
"Daily Note     Today's date: 2024  Patient name: Gabriel Raza  : 1980  MRN: [id number]  Referring provider: Silvio Smith, PT  Dx:   Encounter Diagnosis     ICD-10-CM    1. Heel pain, bilateral  M79.671     M79.672                      Subjective: Pt notes soreness in L Achilles and foot following LV but felt better next day. Significant reduction in L calf soreness and tightness today after manuals.    Objective: See treatment diary below     Precautions: none    Manuals            B ankle mobs             B subtalar mobs             L forefoot mob             EPAT             B calf/plantar fascia mx work  L calf STM>IASTM & MFD           Neuro Re-Ed             Toe intrinsic circuit                                                                                           Ther Ex             Calf stretch r Prostretch 30\"x3 ea           Functional soleus stretch r            Calf raises r            Self calcaneal mob r                         bike  5'           Ankle 4way                                                    Modalities                                          Assessment: Tolerated treatment well. Patient demonstrated fatigue post treatment, exhibited good technique with therapeutic exercises, and would benefit from continued PT    Plan: Continue per plan of care.  Progress treatment as tolerated.    Keily Gaytan    "

## 2024-09-23 ENCOUNTER — OFFICE VISIT (OUTPATIENT)
Dept: PHYSICAL THERAPY | Facility: CLINIC | Age: 44
End: 2024-09-23
Payer: COMMERCIAL

## 2024-09-23 DIAGNOSIS — M79.672 HEEL PAIN, BILATERAL: Primary | ICD-10-CM

## 2024-09-23 DIAGNOSIS — M79.671 HEEL PAIN, BILATERAL: Primary | ICD-10-CM

## 2024-09-23 PROCEDURE — 97140 MANUAL THERAPY 1/> REGIONS: CPT | Performed by: PHYSICAL THERAPIST

## 2024-09-23 PROCEDURE — 97110 THERAPEUTIC EXERCISES: CPT | Performed by: PHYSICAL THERAPIST

## 2024-09-23 PROCEDURE — 97112 NEUROMUSCULAR REEDUCATION: CPT | Performed by: PHYSICAL THERAPIST

## 2024-09-23 NOTE — PROGRESS NOTES
"Daily Note     Today's date: 2024  Patient name: Gabriel Raza  : 1980  MRN: [id number]  Referring provider: Silvio Smith, PT  Dx:   Encounter Diagnosis     ICD-10-CM    1. Heel pain, bilateral  M79.671     M79.672                      Subjective: Patient notes able to walk more it seems but still gets sore the more he walks and then after he sits for a little while and gets up. Notes then stretched recently and felt something \"pull/pop\" in B calves and since then seems to have better tolerance for walking an less stiffness.    Objective: See treatment diary below     Precautions: none    Manuals           B ankle mobs   MK          B subtalar mobs   MK          L forefoot mob   MK          EPAT             B calf/plantar fascia mx work  L calf STM>IASTM & MFD MK B IASTM and L MFD          Neuro Re-Ed             Toe intrinsic circuit   10x 5\" holds short foot, 10x toe yoga each, 10x toe splay                                                                                        Ther Ex             Calf stretch r Prostretch 30\"x3 ea           Functional soleus stretch r            Calf raises r            Self calcaneal mob r                         bike  5' 5 min          Ankle 4way                                                    Modalities                                          Assessment: Tolerated treatment well. Patient demonstrated fatigue post treatment, exhibited good technique with therapeutic exercises, and would benefit from continued PT    Plan: Continue per plan of care.  Progress treatment as tolerated.    Silvio Smith    " Hpi Title: Evaluation of Skin Lesions How Severe Are Your Spot(S)?: mild Have Your Spot(S) Been Treated In The Past?: has not been treated

## 2024-09-26 ENCOUNTER — OFFICE VISIT (OUTPATIENT)
Dept: PHYSICAL THERAPY | Facility: CLINIC | Age: 44
End: 2024-09-26
Payer: COMMERCIAL

## 2024-09-26 DIAGNOSIS — M79.671 HEEL PAIN, BILATERAL: Primary | ICD-10-CM

## 2024-09-26 DIAGNOSIS — M79.672 HEEL PAIN, BILATERAL: Primary | ICD-10-CM

## 2024-09-26 PROCEDURE — 97112 NEUROMUSCULAR REEDUCATION: CPT

## 2024-09-26 PROCEDURE — 97110 THERAPEUTIC EXERCISES: CPT

## 2024-09-26 PROCEDURE — 97140 MANUAL THERAPY 1/> REGIONS: CPT

## 2024-09-26 NOTE — PROGRESS NOTES
"Daily Note     Today's date: 2024  Patient name: Gabriel Raza  : 1980  MRN: [id number]  Referring provider: Silvio Smith, PT  Dx:   Encounter Diagnosis     ICD-10-CM    1. Heel pain, bilateral  M79.671     M79.672                      Subjective: Improving pain and mobility. L>R calf TTP (both very mild).    Objective: See treatment diary below     Precautions: none    Manuals          B ankle mobs   MK          B subtalar mobs   MK          L forefoot mob   MK          EPAT             B calf/plantar fascia mx work  L calf STM>IASTM & MFD MK B IASTM and L MFD SH DTM         Neuro Re-Ed           Toe intrinsic circuit   10x 5\" holds short foot, 10x toe yoga each, 10x toe splay                                                                                        Ther Ex           Calf stretch r Prostretch 30\"x3 ea  Prostretch 30\"x3 ea         Functional soleus stretch r            Calf raises r   Leg press 135# 3x10         Self calcaneal mob r                         bike  5' 5 min 5' SB L3         Ankle 4way                                                    Modalities                                          Assessment: Tolerated treatment well. Patient demonstrated fatigue post treatment, exhibited good technique with therapeutic exercises, and would benefit from continued PT    Plan: Continue per plan of care.  Progress treatment as tolerated.    Keily Gaytan    "

## 2024-09-30 ENCOUNTER — OFFICE VISIT (OUTPATIENT)
Dept: PHYSICAL THERAPY | Facility: CLINIC | Age: 44
End: 2024-09-30
Payer: COMMERCIAL

## 2024-09-30 DIAGNOSIS — M79.672 HEEL PAIN, BILATERAL: Primary | ICD-10-CM

## 2024-09-30 DIAGNOSIS — M79.671 HEEL PAIN, BILATERAL: Primary | ICD-10-CM

## 2024-09-30 PROCEDURE — 97110 THERAPEUTIC EXERCISES: CPT | Performed by: PHYSICAL THERAPIST

## 2024-09-30 PROCEDURE — 97140 MANUAL THERAPY 1/> REGIONS: CPT | Performed by: PHYSICAL THERAPIST

## 2024-09-30 PROCEDURE — 97112 NEUROMUSCULAR REEDUCATION: CPT | Performed by: PHYSICAL THERAPIST

## 2024-09-30 NOTE — PROGRESS NOTES
"Daily Note     Today's date: 2024  Patient name: Gabriel Raza  : 1980  MRN: [id number]  Referring provider: Silvio Smith, PT  Dx:   Encounter Diagnosis     ICD-10-CM    1. Heel pain, bilateral  M79.671     M79.672                      Subjective: Improving pain and mobility. Notes think tape is helping.    Objective: See treatment diary below     Precautions: none    Manuals         B ankle mobs   MK          B subtalar mobs   MK          L forefoot mob   MK          EPAT             B calf/plantar fascia mx work  L calf STM>IASTM & MFD MK B IASTM and L MFD SH DTM MK and IASTM        Neuro Re-Ed           Toe intrinsic circuit   10x 5\" holds short foot, 10x toe yoga each, 10x toe splay                                                                                        Ther Ex           Calf stretch r Prostretch 30\"x3 ea  Prostretch 30\"x3 ea 3x30\" holds B         Functional soleus stretch r            Calf raises r   Leg press 135# 3x10 3x10 135 lbs         Self calcaneal mob r                         bike  5' 5 min 5' SB L3 5 min        Ankle 4way                                                    Modalities                                          Assessment: Tolerated treatment well. Patient demonstrated fatigue post treatment, exhibited good technique with therapeutic exercises, and would benefit from continued PT    Plan: Continue per plan of care.  Progress treatment as tolerated.    Silvio Smith    "

## 2024-10-03 ENCOUNTER — OFFICE VISIT (OUTPATIENT)
Dept: PHYSICAL THERAPY | Facility: CLINIC | Age: 44
End: 2024-10-03
Payer: COMMERCIAL

## 2024-10-03 DIAGNOSIS — M79.671 HEEL PAIN, BILATERAL: Primary | ICD-10-CM

## 2024-10-03 DIAGNOSIS — M79.672 HEEL PAIN, BILATERAL: Primary | ICD-10-CM

## 2024-10-03 PROCEDURE — 97112 NEUROMUSCULAR REEDUCATION: CPT

## 2024-10-03 PROCEDURE — 97140 MANUAL THERAPY 1/> REGIONS: CPT

## 2024-10-03 PROCEDURE — 97110 THERAPEUTIC EXERCISES: CPT

## 2024-10-03 NOTE — PROGRESS NOTES
"Daily Note     Today's date: 10/3/2024  Patient name: Gabriel Raza  : 1980  MRN: [id number]  Referring provider: Silvio Smtih, PT  Dx:   Encounter Diagnosis     ICD-10-CM    1. Heel pain, bilateral  M79.671     M79.672                      Subjective: Significant improvement in calf tightness and heel pain, though he continues to note L>R medial heel soreness when elongating gait in heel-toe pattern.    Objective: See treatment diary below     Precautions: none    Manuals  10/3       B ankle mobs   MK          B subtalar mobs   MK          L forefoot mob   MK          EPAT             B calf/plantar fascia mx work  L calf STM>IASTM & MFD MK B IASTM and L MFD SH DTM MK and IASTM SH & IASTM       Neuro Re-Ed  9/19  9/26  10/3       Toe intrinsic circuit   10x 5\" holds short foot, 10x toe yoga each, 10x toe splay                                                                                        Ther Ex  9/19  9/26  10/3       Calf stretch r Prostretch 30\"x3 ea  Prostretch 30\"x3 ea 3x30\" holds B  Prostretch  30\"x3       Functional soleus stretch r            Calf raises r   Leg press 135# 3x10 3x10 135 lbs  155# 3x10       Self calcaneal mob r                         bike  5' 5 min 5' SB L3 5 min 5'       Ankle 4way                                                    Modalities                                          Assessment: Tolerated treatment well. Patient demonstrated fatigue post treatment, exhibited good technique with therapeutic exercises, and would benefit from continued PT    Plan: Continue per plan of care.  Progress treatment as tolerated.    Keily Gaytan    "

## 2024-10-07 ENCOUNTER — OFFICE VISIT (OUTPATIENT)
Dept: PHYSICAL THERAPY | Facility: CLINIC | Age: 44
End: 2024-10-07
Payer: COMMERCIAL

## 2024-10-07 DIAGNOSIS — M79.672 HEEL PAIN, BILATERAL: Primary | ICD-10-CM

## 2024-10-07 DIAGNOSIS — M79.671 HEEL PAIN, BILATERAL: Primary | ICD-10-CM

## 2024-10-07 PROCEDURE — 97140 MANUAL THERAPY 1/> REGIONS: CPT

## 2024-10-07 PROCEDURE — 97112 NEUROMUSCULAR REEDUCATION: CPT

## 2024-10-07 PROCEDURE — 97110 THERAPEUTIC EXERCISES: CPT

## 2024-10-07 NOTE — PROGRESS NOTES
"Daily Note     Today's date: 10/7/2024  Patient name: Gabriel Raza  : 1980  MRN: [id number]  Referring provider: Silvio Smith, SANJEEV  Dx:   Encounter Diagnosis     ICD-10-CM    1. Heel pain, bilateral  M79.671     M79.672                        Subjective: Patient reports improvement in symptoms, notes less stiffness overall when standing up from seated position. Went for a motorcycle ride on Saturday for 2 hours and felt fine walking afterward. Used to have pain in bottom of both feet and then both heels, but currently right feels great and left is just a little bit of discomfort in heel that resolves after walking and loosening up or finds a step and does the stretches which also help. Will call to schedule once recovered from outpatient surgery.     Objective: See treatment diary below     Precautions: none    Manuals 9/17 9/19 9/23 9/26 9/30 10/3 10/7      B ankle mobs   MK          B subtalar mobs   MK          L forefoot mob   MK          EPAT             B calf/plantar fascia mx work  L calf STM>IASTM & MFD MK B IASTM and L MFD SH DTM MK and IASTM SH & IASTM CS & IASTM gr V talocrural mob B      Neuro Re-Ed  9/19  9/26  10/3       Toe intrinsic circuit   10x 5\" holds short foot, 10x toe yoga each, 10x toe splay          Sneaky lunge       X10 B       Functional calf raise       2x5 B                                                           Ther Ex  9/19  9/26  10/3       Calf stretch r Prostretch 30\"x3 ea  Prostretch 30\"x3 ea 3x30\" holds B  Prostretch  30\"x3 Prostretch 3x30\"      Functional soleus stretch r            Calf raises r   Leg press 135# 3x10 3x10 135 lbs  155# 3x10 155# x20      Self calcaneal mob r                         bike  5' 5 min 5' SB L3 5 min 5' 5'      Ankle 4way                                                    Modalities                                          Assessment: Tolerated treatment well. Patient had favorable response to gr V talocrural distraction with " decreased feelings of tightness in heels. Progression of gastroc and soleus strengthening without adverse reaction. Patient demonstrated fatigue post treatment, exhibited good technique with therapeutic exercises, and would benefit from continued PT. Patient will call to schedule appts once recovered from outpatient surgery.     Plan: Continue per plan of care.  Progress treatment as tolerated.    Eladia Mari

## 2024-12-10 ENCOUNTER — OFFICE VISIT (OUTPATIENT)
Dept: PHYSICAL THERAPY | Facility: CLINIC | Age: 44
End: 2024-12-10
Payer: COMMERCIAL

## 2024-12-10 DIAGNOSIS — M79.671 HEEL PAIN, BILATERAL: Primary | ICD-10-CM

## 2024-12-10 DIAGNOSIS — M79.672 HEEL PAIN, BILATERAL: Primary | ICD-10-CM

## 2024-12-10 PROCEDURE — 97112 NEUROMUSCULAR REEDUCATION: CPT | Performed by: PHYSICAL THERAPIST

## 2024-12-10 PROCEDURE — 97162 PT EVAL MOD COMPLEX 30 MIN: CPT | Performed by: PHYSICAL THERAPIST

## 2024-12-10 NOTE — PROGRESS NOTES
PT Evaluation     Today's date: 12/10/2024  Patient name: Gabriel Raza  : 1980  MRN: [id number]  Referring provider: Silvio Smith, PT  Dx:   Encounter Diagnosis     ICD-10-CM    1. Heel pain, bilateral  M79.671     M79.672           Assessment  Impairments: abnormal muscle tone, abnormal movement, activity intolerance, lacks appropriate home exercise program, pain with function, weight-bearing intolerance, participation limitations and activity limitations  Symptom irritability: moderate     Assessment details: 44 year old male patient reports to PT with increase in B (R>L) heel pain. No red flags noted and patient denies numbness/tingling. Patient presents with signs/symptoms correlating with plantar fasciitis.  Patient primary movement impairments related to his symptoms are decreased ankle and foot flexibility/mobility. Patient also has decreased motor coordination of ankle musculature. Patient will benefit from skilled PT services to address current impairments and functional limitations to help patient return to his PLOF.         Understanding of Dx/Px/POC: good     Prognosis: good     Goals  STG  1. Patient will be independent with completion of HEP throughout therapy  2. Patient will have at worst 4/10 pain so patient can get out of bed with less discomfort in 3 weeks.  LTG  1. Patient will ambulate for prolonged periods without increase in symptoms so patient can navigate throughout the community with less difficulty in 6 weeks.   2. Patient will partake in Taekwondo without increase in difficulty or symptoms in 6 weeks.      Plan  Patient would benefit from: skilled physical therapy     Planned therapy interventions: abdominal trunk stabilization, activity modification, balance, kinesiology taping, joint mobilization, IASTM, manual therapy, motor coordination training, neuromuscular re-education, nerve gliding, patient/caregiver education, flexibility, functional ROM exercises, gait training,  home exercise program, therapeutic exercise, therapeutic activities, stretching and strengthening     Frequency: 2x week  Duration in weeks: 4  Treatment plan discussed with: patient           Subjective Evaluation     History of Present Illness  Mechanism of injury: Patient with B plantar fasciitis (R>L). Patient notes his symptoms flared up after walking a lot on vacation. Patient note similar symptoms to last time came for PT, which helped.    Patient Goals  Patient goals for therapy: decreased pain, increased motion, increased strength and return to sport/leisure activities     Pain  Current pain ratin  At best pain ratin  At worst pain ratin  Quality: dull ache and sharp  Relieving factors: change in position, relaxation and medications  Aggravating factors: walking and standing     Treatments  Current treatment: physical therapy           Objective      Passive Range of Motion   Left Ankle/Foot     Plantar flexion: WFL  Inversion: WFL  Eversion: WFL     Right Ankle/Foot     Plantar flexion: WFL  Inversion: WFL  Eversion: WFL     Additional Passive Range of Motion Details  Decreased ankle DF flexibility B (R>L)   -increased L ankle talocrural joint tightness      Joint Play   Left Ankle/Foot  Hypomobile in the talocrural joint, subtalar joint and forefoot.      Right Ankle/Foot  Hypomobile in the subtalar joint.          Strength/Myotome Testing      Left Ankle/Foot   Dorsiflexion: 4-  Plantar flexion: 4  Inversion: 4-  Eversion: 4-     Right Ankle/Foot   Dorsiflexion: 4-  Plantar flexion: 4  Inversion: 4-  Eversion: 4-              Precautions: none        Manuals 12/10                     B ankle mobs                       B subtalar mobs                       L forefoot mob                       EPAT                       B calf/plantar fascia mx work  MK with MFD                     Neuro Re-Ed                       Toe intrinsic circuit                                                             "                                                                                                           Ther Ex                       Calf stretch Prostretch 3x30\" holds                      Functional soleus stretch 10x 5\" holds                     Calf raises r                     Self calcaneal mob r                      plantar fascia stretch 3x30\" holds                     bike                       Ankle 4way                        hamstring stretch/nerve glide Supine 3x30\" holds                     Ther Activity                                                                       Gait Training                                                                       Modalities                                                                          "

## 2024-12-12 ENCOUNTER — OFFICE VISIT (OUTPATIENT)
Dept: PHYSICAL THERAPY | Facility: CLINIC | Age: 44
End: 2024-12-12
Payer: COMMERCIAL

## 2024-12-12 DIAGNOSIS — M79.672 HEEL PAIN, BILATERAL: Primary | ICD-10-CM

## 2024-12-12 DIAGNOSIS — M79.671 HEEL PAIN, BILATERAL: Primary | ICD-10-CM

## 2024-12-12 PROCEDURE — 97140 MANUAL THERAPY 1/> REGIONS: CPT

## 2024-12-12 PROCEDURE — 97112 NEUROMUSCULAR REEDUCATION: CPT

## 2024-12-12 PROCEDURE — 97110 THERAPEUTIC EXERCISES: CPT

## 2024-12-12 NOTE — PROGRESS NOTES
"Daily Note     Today's date: 2024  Patient name: Gabriel Raza  : 1980  MRN: [id number]  Referring provider: Silvio Smith, PT  Dx:   Encounter Diagnosis     ICD-10-CM    1. Heel pain, bilateral  M79.671     M79.672                      Subjective: Pt noted heel pain in center of fat pad and medial to fat pad following LV but notes that it improves with plantar fascia stretch.    Objective: See treatment diary below     Precautions: none  Manuals 12/10  12/12                   B ankle mobs                       B subtalar mobs                       L forefoot mob                       EPAT                       B calf/plantar fascia mx work  MK with MFD  SH                    Neuro Re-Ed                       Toe intrinsic circuit                                                                       Ther Ex                       Prostretch 3x30\" holds   held                   Functional soleus stretch 10x 5\" holds 5\"x10                   Calf raises r                     Self calcaneal mob r                      plantar fascia stretch 3x30\" holds  30\"x3 towel                   bike                       Ankle 4way                        hamstring stretch/nerve glide Supine 3x30\" holds Supine 30\"x3                                                                       Assessment: Tolerated treatment well. Patient demonstrated fatigue post treatment, exhibited good technique with therapeutic exercises, and would benefit from continued PT    Plan: Continue per plan of care.  Progress treatment as tolerated.    Keily Gaytan    "

## 2024-12-17 ENCOUNTER — OFFICE VISIT (OUTPATIENT)
Dept: PHYSICAL THERAPY | Facility: CLINIC | Age: 44
End: 2024-12-17
Payer: COMMERCIAL

## 2024-12-17 DIAGNOSIS — M79.671 HEEL PAIN, BILATERAL: Primary | ICD-10-CM

## 2024-12-17 DIAGNOSIS — M79.672 HEEL PAIN, BILATERAL: Primary | ICD-10-CM

## 2024-12-17 PROCEDURE — 97140 MANUAL THERAPY 1/> REGIONS: CPT

## 2024-12-17 PROCEDURE — 97110 THERAPEUTIC EXERCISES: CPT

## 2024-12-17 PROCEDURE — 97112 NEUROMUSCULAR REEDUCATION: CPT

## 2024-12-17 NOTE — PROGRESS NOTES
"Daily Note     Today's date: 2024  Patient name: Gabriel Raza  : 1980  MRN: [id number]  Referring provider: Silvio Smith, PT  Dx:   Encounter Diagnosis     ICD-10-CM    1. Heel pain, bilateral  M79.671     M79.672                      Subjective: Pt reports complete resolution of L heel pain and notes minimal R medial and lateral fat pad pain, which is reduced since LV. Further reduction in pain post-session today.    Objective: See treatment diary below     Precautions: none  Manuals 12/10  12/12 12/17                  B ankle mobs                       B subtalar mobs                       L forefoot mob                       EPAT                       B calf/plantar fascia mx work  MK with MFD  SH R SH R                  Neuro Re-Ed                    Toe intrinsic circuit                        sliders 3way      5x R stance                                         Ther Ex                    Prostretch 3x30\" holds   held  -                 Functional soleus stretch 10x 5\" holds 5\"x10 5\"x10                  Calf raises r   Leg press calf raises 145# 2x20                  Self calcaneal mob r                      plantar fascia stretch 3x30\" holds  30\"x3 towel  30\"x3                 bike  5'  5' 5'                  Ankle 4way                        hamstring stretch/nerve glide Supine 3x30\" holds Supine 30\"x3   20 ankle pumps 3x R                                                                    Assessment: Tolerated treatment well. Patient demonstrated fatigue post treatment, exhibited good technique with therapeutic exercises, and would benefit from continued PT    Plan: Continue per plan of care.  Progress treatment as tolerated.    Keily Gaytan    "

## 2024-12-19 ENCOUNTER — OFFICE VISIT (OUTPATIENT)
Dept: PHYSICAL THERAPY | Facility: CLINIC | Age: 44
End: 2024-12-19
Payer: COMMERCIAL

## 2024-12-19 DIAGNOSIS — M79.672 HEEL PAIN, BILATERAL: Primary | ICD-10-CM

## 2024-12-19 DIAGNOSIS — M79.671 HEEL PAIN, BILATERAL: Primary | ICD-10-CM

## 2024-12-19 PROCEDURE — 97110 THERAPEUTIC EXERCISES: CPT | Performed by: PHYSICAL THERAPIST

## 2024-12-19 PROCEDURE — 97140 MANUAL THERAPY 1/> REGIONS: CPT | Performed by: PHYSICAL THERAPIST

## 2024-12-19 PROCEDURE — 97112 NEUROMUSCULAR REEDUCATION: CPT | Performed by: PHYSICAL THERAPIST

## 2024-12-19 NOTE — PROGRESS NOTES
"Daily Note     Today's date: 2024  Patient name: Gabriel Raza  : 1980  MRN: [id number]  Referring provider: Silvio Smith, PT  Dx:   Encounter Diagnosis     ICD-10-CM    1. Heel pain, bilateral  M79.671     M79.672                      Subjective: Patient reports continued improvement.     Objective: See treatment diary below     Precautions: none  Manuals 12/10  12/12 12/17  12/19                B ankle mobs                       B subtalar mobs                       L forefoot mob                       EPAT                       B calf/plantar fascia mx work  MK with MFD  SH R SH R  MK R                Neuro Re-Ed                    Toe intrinsic circuit                        sliders 3way      5x R stance  5x                                        Ther Ex                    Prostretch 3x30\" holds   held  -                 Functional soleus stretch 10x 5\" holds 5\"x10 5\"x10  10x 5\" holds                Calf raises r   Leg press calf raises 145# 2x20  2x20 145 lbs                 Self calcaneal mob r                      plantar fascia stretch 3x30\" holds  30\"x3 towel  30\"x3 3x30\" holds                bike  5'  5' 5'  5 min                 Ankle 4way                        hamstring stretch/nerve glide Supine 3x30\" holds Supine 30\"x3   20 ankle pumps 3x R 3x 20 ankle pumps R                                                                   Assessment: Tolerated treatment well. Patient demonstrated fatigue post treatment, exhibited good technique with therapeutic exercises, and would benefit from continued PT    Plan: Continue per plan of care.  Progress treatment as tolerated.    Silvio Smith    "

## 2024-12-23 ENCOUNTER — APPOINTMENT (OUTPATIENT)
Dept: PHYSICAL THERAPY | Facility: CLINIC | Age: 44
End: 2024-12-23
Payer: COMMERCIAL

## 2025-01-14 ENCOUNTER — OFFICE VISIT (OUTPATIENT)
Dept: FAMILY MEDICINE CLINIC | Facility: CLINIC | Age: 45
End: 2025-01-14
Payer: COMMERCIAL

## 2025-01-14 VITALS
BODY MASS INDEX: 39.1 KG/M2 | SYSTOLIC BLOOD PRESSURE: 110 MMHG | OXYGEN SATURATION: 96 % | TEMPERATURE: 96.8 F | DIASTOLIC BLOOD PRESSURE: 82 MMHG | HEIGHT: 68 IN | HEART RATE: 84 BPM | WEIGHT: 258 LBS

## 2025-01-14 DIAGNOSIS — Z00.00 ANNUAL PHYSICAL EXAM: Primary | ICD-10-CM

## 2025-01-14 DIAGNOSIS — Z87.19 HISTORY OF HEMORRHOIDS: ICD-10-CM

## 2025-01-14 DIAGNOSIS — Z23 ENCOUNTER FOR IMMUNIZATION: ICD-10-CM

## 2025-01-14 DIAGNOSIS — K21.9 GASTROESOPHAGEAL REFLUX DISEASE WITHOUT ESOPHAGITIS: ICD-10-CM

## 2025-01-14 DIAGNOSIS — R68.82 LOW LIBIDO: ICD-10-CM

## 2025-01-14 DIAGNOSIS — D50.8 IRON DEFICIENCY ANEMIA REFRACTORY TO IRON THERAPY: ICD-10-CM

## 2025-01-14 DIAGNOSIS — Z13.6 SCREENING FOR CARDIOVASCULAR CONDITION: ICD-10-CM

## 2025-01-14 DIAGNOSIS — Z23 NEED FOR COVID-19 VACCINE: ICD-10-CM

## 2025-01-14 PROCEDURE — 99214 OFFICE O/P EST MOD 30 MIN: CPT | Performed by: FAMILY MEDICINE

## 2025-01-14 PROCEDURE — 91320 SARSCV2 VAC 30MCG TRS-SUC IM: CPT | Performed by: FAMILY MEDICINE

## 2025-01-14 PROCEDURE — 99396 PREV VISIT EST AGE 40-64: CPT | Performed by: FAMILY MEDICINE

## 2025-01-14 PROCEDURE — 90656 IIV3 VACC NO PRSV 0.5 ML IM: CPT | Performed by: FAMILY MEDICINE

## 2025-01-14 PROCEDURE — 90471 IMMUNIZATION ADMIN: CPT | Performed by: FAMILY MEDICINE

## 2025-01-14 PROCEDURE — 90480 ADMN SARSCOV2 VAC 1/ONLY CMP: CPT | Performed by: FAMILY MEDICINE

## 2025-01-14 NOTE — ASSESSMENT & PLAN NOTE
stable  Orders:    Lipid panel; Future    CBC and differential; Future    Comprehensive metabolic panel; Future    TSH, 3rd generation with Free T4 reflex; Future

## 2025-01-14 NOTE — ASSESSMENT & PLAN NOTE
Improved  Followed by hem/onc  Orders:    Lipid panel; Future    CBC and differential; Future    Comprehensive metabolic panel; Future    TSH, 3rd generation with Free T4 reflex; Future

## 2025-01-14 NOTE — PROGRESS NOTES
Name: Gabriel Raza      : 1980      MRN: [id number]  Encounter Provider: Salma Loo MD  Encounter Date: 2025   Encounter department: [facility]  :  Assessment & Plan  Annual physical exam  Health maintenance reviewed  Labs ordered  Orders:    Lipid panel; Future    CBC and differential; Future    Comprehensive metabolic panel; Future    TSH, 3rd generation with Free T4 reflex; Future    Gastroesophageal reflux disease without esophagitis  stable  Orders:    Lipid panel; Future    CBC and differential; Future    Comprehensive metabolic panel; Future    TSH, 3rd generation with Free T4 reflex; Future    Iron deficiency anemia refractory to iron therapy  Improved  Followed by hem/onc  Orders:    Lipid panel; Future    CBC and differential; Future    Comprehensive metabolic panel; Future    TSH, 3rd generation with Free T4 reflex; Future    History of hemorrhoids  S/p surgery  Orders:    Lipid panel; Future    CBC and differential; Future    Comprehensive metabolic panel; Future    TSH, 3rd generation with Free T4 reflex; Future    Low libido  Improved on supplement  Orders:    Lipid panel; Future    CBC and differential; Future    Comprehensive metabolic panel; Future    TSH, 3rd generation with Free T4 reflex; Future    Screening for cardiovascular condition    Orders:    Lipid panel; Future    CBC and differential; Future    Comprehensive metabolic panel; Future    TSH, 3rd generation with Free T4 reflex; Future    Encounter for immunization    Orders:    Lipid panel; Future    CBC and differential; Future    Comprehensive metabolic panel; Future    TSH, 3rd generation with Free T4 reflex; Future    influenza vaccine preservative-free 0.5 mL IM (Fluzone, Afluria, Fluarix, Flulaval)    BMI 39.0-39.9,adult    Orders:    Lipid panel; Future    CBC and differential; Future    Comprehensive metabolic panel; Future    TSH, 3rd generation with Free T4 reflex; Future    Need for  "COVID-19 vaccine    Orders:    COVID-19 Pfizer mRNA vaccine 12 yr and older (Comirnaty pre-filled syringe)           History of Present Illness     HPI  44yom here for well exam  Doing good  Had hemorrhoid surgery  Iron levels have been good, states he is due for follow up with men/onc  Bowel movements have been good, no further issues  Taking supplements from health food store and feeling better, less tired  Health maintenance reviewed  Labs ordered  Flu and COVID today    PHQ-2/9 Depression Screening    Little interest or pleasure in doing things: 0 - not at all  Feeling down, depressed, or hopeless: 0 - not at all  PHQ-2 Score: 0  PHQ-2 Interpretation: Negative depression screen         Review of Systems   Constitutional: Negative.  Negative for chills and fever.   HENT: Negative.  Negative for ear pain and sore throat.    Eyes:  Negative for pain and visual disturbance.   Respiratory: Negative.  Negative for cough and shortness of breath.    Cardiovascular: Negative.  Negative for chest pain and palpitations.   Gastrointestinal: Negative.  Negative for abdominal pain and vomiting.   Genitourinary: Negative.  Negative for dysuria and hematuria.   Musculoskeletal: Negative.  Negative for arthralgias and back pain.   Skin: Negative.  Negative for color change and rash.   Neurological: Negative.  Negative for seizures and syncope.   Psychiatric/Behavioral: Negative.     All other systems reviewed and are negative.      Objective   /82 (BP Location: Left arm, Patient Position: Sitting, Cuff Size: Large)   Pulse 84   Temp (!) 96.8 °F (36 °C) (Temporal)   Ht 5' 8\" (1.727 m)   Wt 117 kg (258 lb)   SpO2 96%   BMI 39.23 kg/m²      Physical Exam  Vitals and nursing note reviewed.   Constitutional:       General: He is not in acute distress.     Appearance: Normal appearance. He is well-developed and normal weight.   HENT:      Head: Normocephalic and atraumatic.      Right Ear: Tympanic membrane normal.      " Left Ear: Tympanic membrane normal.      Nose: Nose normal.      Mouth/Throat:      Mouth: Mucous membranes are moist.   Eyes:      Extraocular Movements: Extraocular movements intact.      Conjunctiva/sclera: Conjunctivae normal.      Pupils: Pupils are equal, round, and reactive to light.   Cardiovascular:      Rate and Rhythm: Normal rate and regular rhythm.      Pulses: Normal pulses.      Heart sounds: Normal heart sounds. No murmur heard.  Pulmonary:      Effort: Pulmonary effort is normal. No respiratory distress.      Breath sounds: Normal breath sounds.   Abdominal:      General: Bowel sounds are normal. There is no distension.      Palpations: Abdomen is soft.      Tenderness: There is no abdominal tenderness.   Musculoskeletal:         General: No swelling. Normal range of motion.      Cervical back: Normal range of motion and neck supple.   Skin:     General: Skin is warm and dry.      Capillary Refill: Capillary refill takes less than 2 seconds.   Neurological:      General: No focal deficit present.      Mental Status: He is alert and oriented to person, place, and time. Mental status is at baseline.      Cranial Nerves: No cranial nerve deficit.   Psychiatric:         Mood and Affect: Mood normal.         Behavior: Behavior normal.         Thought Content: Thought content normal.

## 2025-01-14 NOTE — PATIENT INSTRUCTIONS
"Patient Education     Routine physical for adults   The Basics   Written by the doctors and editors at [facility]   What is a physical? -- A physical is a routine visit, or \"check-up,\" with your doctor. You might also hear it called a \"wellness visit\" or \"preventive visit.\"  During each visit, the doctor will:   Ask about your physical and mental health   Ask about your habits, behaviors, and lifestyle   Do an exam   Give you vaccines if needed   Talk to you about any medicines you take   Give advice about your health   Answer your questions  Getting regular check-ups is an important part of taking care of your health. It can help your doctor find and treat any problems you have. But it's also important for preventing health problems.  A routine physical is different from a \"sick visit.\" A sick visit is when you see a doctor because of a health concern or problem. Since physicals are scheduled ahead of time, you can think about what you want to ask the doctor.  How often should I get a physical? -- It depends on your age and health. In general, for people age 21 years and older:   If you are younger than 50 years, you might be able to get a physical every 3 years.   If you are 50 years or older, your doctor might recommend a physical every year.  If you have an ongoing health condition, like diabetes or high blood pressure, your doctor will probably want to see you more often.  What happens during a physical? -- In general, each visit will include:   Physical exam - The doctor or nurse will check your height, weight, heart rate, and blood pressure. They will also look at your eyes and ears. They will ask about how you are feeling and whether you have any symptoms that bother you.   Medicines - It's a good idea to bring a list of all the medicines you take to each doctor visit. Your doctor will talk to you about your medicines and answer any questions. Tell them if you are having any side effects that bother you. You " "should also tell them if you are having trouble paying for any of your medicines.   Habits and behaviors - This includes:   Your diet   Your exercise habits   Whether you smoke, drink alcohol, or use drugs   Whether you are sexually active   Whether you feel safe at home  Your doctor will talk to you about things you can do to improve your health and lower your risk of health problems. They will also offer help and support. For example, if you want to quit smoking, they can give you advice and might prescribe medicines. If you want to improve your diet or get more physical activity, they can help you with this, too.   Lab tests, if needed - The tests you get will depend on your age and situation. For example, your doctor might want to check your:   Cholesterol   Blood sugar   Iron level   Vaccines - The recommended vaccines will depend on your age, health, and what vaccines you already had. Vaccines are very important because they can prevent certain serious or deadly infections.   Discussion of screening - \"Screening\" means checking for diseases or other health problems before they cause symptoms. Your doctor can recommend screening based on your age, risk, and preferences. This might include tests to check for:   Cancer, such as breast, prostate, cervical, ovarian, colorectal, prostate, lung, or skin cancer   Sexually transmitted infections, such as chlamydia and gonorrhea   Mental health conditions like depression and anxiety  Your doctor will talk to you about the different types of screening tests. They can help you decide which screenings to have. They can also explain what the results might mean.   Answering questions - The physical is a good time to ask the doctor or nurse questions about your health. If needed, they can refer you to other doctors or specialists, too.  Adults older than 65 years often need other care, too. As you get older, your doctor will talk to you about:   How to prevent falling at " home   Hearing or vision tests   Memory testing   How to take your medicines safely   Making sure that you have the help and support you need at home  All topics are updated as new evidence becomes available and our peer review process is complete.  This topic retrieved from [employer] on: May 02, 2024.  Topic 576564 Version 1.0  Release: 32.4.3 - C32.122  © 2024 UpToDate, Inc. and/or its affiliates. All rights reserved.  Consumer Information Use and Disclaimer   Disclaimer: This generalized information is a limited summary of diagnosis, treatment, and/or medication information. It is not meant to be comprehensive and should be used as a tool to help the user understand and/or assess potential diagnostic and treatment options. It does NOT include all information about conditions, treatments, medications, side effects, or risks that may apply to a specific patient. It is not intended to be medical advice or a substitute for the medical advice, diagnosis, or treatment of a health care provider based on the health care provider's examination and assessment of a patient's specific and unique circumstances. Patients must speak with a health care provider for complete information about their health, medical questions, and treatment options, including any risks or benefits regarding use of medications. This information does not endorse any treatments or medications as safe, effective, or approved for treating a specific patient. UpToDate, Inc. and its affiliates disclaim any warranty or liability relating to this information or the use thereof.The use of this information is governed by the Terms of Use, available at https://www.[employer]uwer.com/en/know/clinical-effectiveness-terms. 2024© UpToDate, Inc. and its affiliates and/or licensors. All rights reserved.  Copyright   © 2024 UpToDate, Inc. and/or its affiliates. All rights reserved.

## 2025-01-14 NOTE — ASSESSMENT & PLAN NOTE
S/p surgery  Orders:    Lipid panel; Future    CBC and differential; Future    Comprehensive metabolic panel; Future    TSH, 3rd generation with Free T4 reflex; Future

## 2025-01-15 ENCOUNTER — APPOINTMENT (OUTPATIENT)
Dept: LAB | Facility: CLINIC | Age: 45
End: 2025-01-15
Payer: COMMERCIAL

## 2025-01-15 DIAGNOSIS — K21.9 GASTROESOPHAGEAL REFLUX DISEASE WITHOUT ESOPHAGITIS: ICD-10-CM

## 2025-01-15 DIAGNOSIS — R68.82 LOW LIBIDO: ICD-10-CM

## 2025-01-15 DIAGNOSIS — D50.8 IRON DEFICIENCY ANEMIA REFRACTORY TO IRON THERAPY: ICD-10-CM

## 2025-01-15 DIAGNOSIS — Z87.19 HISTORY OF HEMORRHOIDS: ICD-10-CM

## 2025-01-15 DIAGNOSIS — Z13.6 SCREENING FOR CARDIOVASCULAR CONDITION: ICD-10-CM

## 2025-01-15 DIAGNOSIS — Z23 ENCOUNTER FOR IMMUNIZATION: ICD-10-CM

## 2025-01-15 DIAGNOSIS — Z00.00 ANNUAL PHYSICAL EXAM: ICD-10-CM

## 2025-01-15 LAB
ALBUMIN SERPL BCG-MCNC: 4.5 G/DL (ref 3.5–5)
ALP SERPL-CCNC: 63 U/L (ref 34–104)
ALT SERPL W P-5'-P-CCNC: 29 U/L (ref 7–52)
ANION GAP SERPL CALCULATED.3IONS-SCNC: 8 MMOL/L (ref 4–13)
AST SERPL W P-5'-P-CCNC: 24 U/L (ref 13–39)
BASOPHILS # BLD AUTO: 0.06 THOUSANDS/ΜL (ref 0–0.1)
BASOPHILS NFR BLD AUTO: 1 % (ref 0–1)
BILIRUB SERPL-MCNC: 0.94 MG/DL (ref 0.2–1)
BUN SERPL-MCNC: 14 MG/DL (ref 5–25)
CALCIUM SERPL-MCNC: 9.5 MG/DL (ref 8.4–10.2)
CHLORIDE SERPL-SCNC: 103 MMOL/L (ref 96–108)
CHOLEST SERPL-MCNC: 172 MG/DL (ref ?–200)
CO2 SERPL-SCNC: 27 MMOL/L (ref 21–32)
CREAT SERPL-MCNC: 0.87 MG/DL (ref 0.6–1.3)
EOSINOPHIL # BLD AUTO: 0.14 THOUSAND/ΜL (ref 0–0.61)
EOSINOPHIL NFR BLD AUTO: 2 % (ref 0–6)
ERYTHROCYTE [DISTWIDTH] IN BLOOD BY AUTOMATED COUNT: 16.3 % (ref 11.6–15.1)
GFR SERPL CREATININE-BSD FRML MDRD: 103 ML/MIN/1.73SQ M
GLUCOSE P FAST SERPL-MCNC: 97 MG/DL (ref 65–99)
HCT VFR BLD AUTO: 50.4 % (ref 36.5–49.3)
HDLC SERPL-MCNC: 41 MG/DL
HGB BLD-MCNC: 15.8 G/DL (ref 12–17)
IMM GRANULOCYTES # BLD AUTO: 0.02 THOUSAND/UL (ref 0–0.2)
IMM GRANULOCYTES NFR BLD AUTO: 0 % (ref 0–2)
LDLC SERPL CALC-MCNC: 97 MG/DL (ref 0–100)
LYMPHOCYTES # BLD AUTO: 1.42 THOUSANDS/ΜL (ref 0.6–4.47)
LYMPHOCYTES NFR BLD AUTO: 22 % (ref 14–44)
MCH RBC QN AUTO: 25.5 PG (ref 26.8–34.3)
MCHC RBC AUTO-ENTMCNC: 31.3 G/DL (ref 31.4–37.4)
MCV RBC AUTO: 81 FL (ref 82–98)
MONOCYTES # BLD AUTO: 0.59 THOUSAND/ΜL (ref 0.17–1.22)
MONOCYTES NFR BLD AUTO: 9 % (ref 4–12)
NEUTROPHILS # BLD AUTO: 4.15 THOUSANDS/ΜL (ref 1.85–7.62)
NEUTS SEG NFR BLD AUTO: 66 % (ref 43–75)
NONHDLC SERPL-MCNC: 131 MG/DL
NRBC BLD AUTO-RTO: 0 /100 WBCS
PLATELET # BLD AUTO: 180 THOUSANDS/UL (ref 149–390)
POTASSIUM SERPL-SCNC: 4.1 MMOL/L (ref 3.5–5.3)
PROT SERPL-MCNC: 7.5 G/DL (ref 6.4–8.4)
RBC # BLD AUTO: 6.19 MILLION/UL (ref 3.88–5.62)
SODIUM SERPL-SCNC: 138 MMOL/L (ref 135–147)
TRIGL SERPL-MCNC: 170 MG/DL (ref ?–150)
TSH SERPL DL<=0.05 MIU/L-ACNC: 2.62 UIU/ML (ref 0.45–4.5)
WBC # BLD AUTO: 6.38 THOUSAND/UL (ref 4.31–10.16)

## 2025-01-15 PROCEDURE — 84443 ASSAY THYROID STIM HORMONE: CPT

## 2025-01-15 PROCEDURE — 36415 COLL VENOUS BLD VENIPUNCTURE: CPT

## 2025-01-15 PROCEDURE — 80053 COMPREHEN METABOLIC PANEL: CPT

## 2025-01-15 PROCEDURE — 80061 LIPID PANEL: CPT

## 2025-01-15 PROCEDURE — 85025 COMPLETE CBC W/AUTO DIFF WBC: CPT

## 2025-01-16 ENCOUNTER — TELEPHONE (OUTPATIENT)
Age: 45
End: 2025-01-16

## 2025-01-16 DIAGNOSIS — B35.9 RINGWORM: Primary | ICD-10-CM

## 2025-01-16 RX ORDER — KETOCONAZOLE 20 MG/G
CREAM TOPICAL 2 TIMES DAILY
Qty: 60 G | Refills: 1 | Status: SHIPPED | OUTPATIENT
Start: 2025-01-16 | End: 2025-01-30

## 2025-01-16 NOTE — TELEPHONE ENCOUNTER
Pt stated he was told by pcp that she would send in an ointment to his pharmacy for ringworm, I don't see any mention in his ov notes but patient is waiting for them.    Please advise

## 2025-01-20 ENCOUNTER — APPOINTMENT (OUTPATIENT)
Dept: PHYSICAL THERAPY | Facility: CLINIC | Age: 45
End: 2025-01-20

## 2025-01-21 ENCOUNTER — RESULTS FOLLOW-UP (OUTPATIENT)
Dept: FAMILY MEDICINE CLINIC | Facility: CLINIC | Age: 45
End: 2025-01-21

## 2025-01-21 ENCOUNTER — OFFICE VISIT (OUTPATIENT)
Dept: PHYSICAL THERAPY | Facility: CLINIC | Age: 45
End: 2025-01-21
Payer: COMMERCIAL

## 2025-01-21 DIAGNOSIS — M79.672 HEEL PAIN, BILATERAL: Primary | ICD-10-CM

## 2025-01-21 DIAGNOSIS — M79.671 HEEL PAIN, BILATERAL: Primary | ICD-10-CM

## 2025-01-21 PROCEDURE — 97140 MANUAL THERAPY 1/> REGIONS: CPT

## 2025-01-21 PROCEDURE — 97112 NEUROMUSCULAR REEDUCATION: CPT

## 2025-01-21 PROCEDURE — 97110 THERAPEUTIC EXERCISES: CPT

## 2025-01-21 NOTE — PROGRESS NOTES
"Daily Note     Today's date: 2025  Patient name: Gabriel Raza  : 1980  MRN: [id number]  Referring provider: Silvio Smith, PT  Dx:   Encounter Diagnosis     ICD-10-CM    1. Heel pain, bilateral  M79.671     M79.672                      Subjective: L heel soreness has resolved. R heel pain is significantly improved but remains a bit tight and sore upon initiating activity.    Objective: See treatment diary below     Precautions: none    Manuals 12/10  12/12 12/17  12/19  1/21              B ankle mobs                       B subtalar mobs                       L forefoot mob                       EPAT                       B calf/ plantar fascia mx work  MK with MFD  SH R SH R  MK R   SH R             Neuro Re-Ed                 Toe intrinsic circuit                        sliders 3way      5x R stance  5x   10x                                     Ther Ex                 Prostretch 3x30\" holds   held  -                 Functional soleus stretch 10x 5\" holds 5\"x10 5\"x10  10x 5\" holds  20\"x5              Calf raises r   Leg press calf raises 145# 2x20  2x20 145 lbs   145# 3x10              Self calcaneal mob r                      plantar fascia stretch 3x30\" holds  30\"x3 towel  30\"x3 3x30\" holds  30\"x3 R             bike  5'  5' 5'  5 min    5' L3             Ankle 4way                        hamstring stretch/nerve glide Supine 3x30\" holds Supine 30\"x3   20 ankle pumps 3x R 3x 20 ankle pumps R  20 ankle pumps 3x                                                                 Assessment: Tolerated treatment well. Patient demonstrated fatigue post treatment, exhibited good technique with therapeutic exercises, and would benefit from continued PT    Plan: Continue per plan of care.  Progress treatment as tolerated.    Keily Gaytan    "

## 2025-01-27 ENCOUNTER — OFFICE VISIT (OUTPATIENT)
Dept: PHYSICAL THERAPY | Facility: CLINIC | Age: 45
End: 2025-01-27

## 2025-01-27 DIAGNOSIS — M79.672 HEEL PAIN, BILATERAL: Primary | ICD-10-CM

## 2025-01-27 DIAGNOSIS — M79.671 HEEL PAIN, BILATERAL: Primary | ICD-10-CM

## 2025-01-27 PROCEDURE — 97110 THERAPEUTIC EXERCISES: CPT

## 2025-01-27 NOTE — PROGRESS NOTES
"Daily Note     Today's date: 2025  Patient name: Gabriel Raza  : 1980  MRN: [id number]  Referring provider: Silvio Smith, PT  Dx:   Encounter Diagnosis     ICD-10-CM    1. Heel pain, bilateral  M79.671     M79.672                      Subjective: Pt reports that he is doing pretty good overall, getting better each week. Notes stiffness in his ankles with prolonged sitting or when he points his toes. Notes the more he walks and stretches the better they feel, specifically the R.      Objective: See treatment diary below     Precautions: none    Manuals 12/10  12/12 12/17  12/19  1/21  1/27            B ankle mobs                       B subtalar mobs                       L forefoot mob                       EPAT                       B calf/ plantar fascia mx work  MK with MFD  SH R SH R  MK R   SH R MM R            Neuro Re-Ed              Toe intrinsic circuit                        sliders 3way      5x R stance  5x   10x 10x                                    Ther Ex               Prostretch 3x30\" holds   held  -                 Functional soleus stretch 10x 5\" holds 5\"x10 5\"x10  10x 5\" holds  20\"x5  20\"x5            Calf raises r   Leg press calf raises 145# 2x20  2x20 145 lbs   145# 3x10   145# 3x10            Self calcaneal mob r                      plantar fascia stretch 3x30\" holds  30\"x3 towel  30\"x3 3x30\" holds  30\"x3 R 30\"x3 R            bike  5'  5' 5'  5 min    5' L3 5' L3            Ankle 4way                        hamstring stretch/nerve glide Supine 3x30\" holds Supine 30\"x3   20 ankle pumps 3x R 3x 20 ankle pumps R  20 ankle pumps 3x  20 ankle pumps 3x                                                               Assessment: Tolerated treatment well. Minimal to no griddiness was present with IASTM to his R plantar region. He felt looser post manuals and was able to continue with all exercises. Patient demonstrated fatigue post " treatment, exhibited good technique with therapeutic exercises, and would benefit from continued PT    Plan: Continue per plan of care.  Progress treatment as tolerated.    Silvio Duff

## 2025-01-31 ENCOUNTER — OFFICE VISIT (OUTPATIENT)
Dept: PHYSICAL THERAPY | Facility: CLINIC | Age: 45
End: 2025-01-31

## 2025-01-31 DIAGNOSIS — M79.672 HEEL PAIN, BILATERAL: Primary | ICD-10-CM

## 2025-01-31 DIAGNOSIS — M79.671 HEEL PAIN, BILATERAL: Primary | ICD-10-CM

## 2025-01-31 PROCEDURE — 97140 MANUAL THERAPY 1/> REGIONS: CPT | Performed by: PHYSICAL THERAPIST

## 2025-01-31 PROCEDURE — 97110 THERAPEUTIC EXERCISES: CPT | Performed by: PHYSICAL THERAPIST

## 2025-02-04 ENCOUNTER — OFFICE VISIT (OUTPATIENT)
Dept: PHYSICAL THERAPY | Facility: CLINIC | Age: 45
End: 2025-02-04

## 2025-02-04 DIAGNOSIS — M79.672 HEEL PAIN, BILATERAL: Primary | ICD-10-CM

## 2025-02-04 DIAGNOSIS — M79.671 HEEL PAIN, BILATERAL: Primary | ICD-10-CM

## 2025-02-04 PROCEDURE — 97110 THERAPEUTIC EXERCISES: CPT | Performed by: PHYSICAL THERAPIST

## 2025-02-04 PROCEDURE — 97140 MANUAL THERAPY 1/> REGIONS: CPT | Performed by: PHYSICAL THERAPIST

## 2025-02-04 NOTE — PROGRESS NOTES
"Daily Note     Today's date: 2025  Patient name: Gabriel aRza  : 1980  MRN: [id number]  Referring provider: Silvio Smith, PT  Dx:   Encounter Diagnosis     ICD-10-CM    1. Heel pain, bilateral  M79.671     M79.672                      Subjective: Patient offers no new complaints. Heels feeling good.       Objective: See treatment diary below      Assessment: Tolerated treatment well. Continued with program as noted below. Patient demonstrated fatigue post treatment, exhibited good technique with therapeutic exercises, and would benefit from continued PT      Plan: Continue per plan of care.      Precautions: none    Manuals  2/4        B ankle mobs               B subtalar mobs               L forefoot mob               EPAT               B calf/ plantar fascia mx work  SH R MM R   SK R  MK R       Neuro Re-Ed          Toe intrinsic circuit                sliders 3way  10x 10x  10x  -                        Ther Ex          Prostretch               Functional soleus stretch 20\"x5  20\"x5  20\"x5   5x 20\" holds         Calf raises 145# 3x10   145# 3x10    145# 3x10  3x12 145 lbs       Self calcaneal mob                plantar fascia stretch  30\"x3 R 30\"x3 R  30\"x3 R  3x30\" holds R         bike  5' L3 5' L3  5' L3   5 min       Ankle 4way                hamstring stretch/nerve glide 20 ankle pumps 3x  20 ankle pumps 3x  20 ankle pumps 3x    completed                                              "

## 2025-02-10 ENCOUNTER — APPOINTMENT (OUTPATIENT)
Dept: PHYSICAL THERAPY | Facility: CLINIC | Age: 45
End: 2025-02-10

## 2025-02-27 NOTE — PROGRESS NOTES
DISPLAY PLAN FREE TEXT Pt arrived to unit without complaint, tolerated Venofer infusion without any adverse reaction. Pt left unit in stable condition without question or concern, AVS declined, aware of next appt scheduled 5/13 1300   DISPLAY PLAN FREE TEXT

## 2025-07-22 ENCOUNTER — OFFICE VISIT (OUTPATIENT)
Dept: FAMILY MEDICINE CLINIC | Facility: CLINIC | Age: 45
End: 2025-07-22
Payer: COMMERCIAL

## 2025-07-22 VITALS
TEMPERATURE: 97.6 F | BODY MASS INDEX: 39.5 KG/M2 | HEIGHT: 68 IN | DIASTOLIC BLOOD PRESSURE: 82 MMHG | SYSTOLIC BLOOD PRESSURE: 124 MMHG | WEIGHT: 260.6 LBS | HEART RATE: 72 BPM | OXYGEN SATURATION: 96 %

## 2025-07-22 DIAGNOSIS — K21.9 GASTROESOPHAGEAL REFLUX DISEASE WITHOUT ESOPHAGITIS: ICD-10-CM

## 2025-07-22 DIAGNOSIS — D50.8 IRON DEFICIENCY ANEMIA REFRACTORY TO IRON THERAPY: Primary | ICD-10-CM

## 2025-07-22 PROCEDURE — 99213 OFFICE O/P EST LOW 20 MIN: CPT | Performed by: FAMILY MEDICINE

## 2025-07-22 RX ORDER — PARSLEY 450 MG
CAPSULE ORAL
COMMUNITY
Start: 2025-05-30

## 2025-07-22 RX ORDER — YOHIMBE BARK 500 MG
CAPSULE ORAL
COMMUNITY
Start: 2025-07-18

## 2025-07-22 NOTE — PROGRESS NOTES
"Name: Gabriel Raza      : 1980      MRN: [id number]  Encounter Provider: Salma Loo MD  Encounter Date: 2025   Encounter department: [facility]  :  Assessment & Plan           History of Present Illness   HPI  45yom here for follow up  Pt doing really well  Has started nutrition and exercise plan in order to be healthier  Feeling really good about his plan- walking, cutting out sugar, going to get back in boxing since shoulder is better  Achilles tendon symptoms improved at least 90%  BP good  No complaints  Well exam due in January  Review of Systems   Constitutional: Negative.  Negative for chills and fever.   HENT: Negative.  Negative for ear pain and sore throat.    Eyes:  Negative for pain and visual disturbance.   Respiratory: Negative.  Negative for cough and shortness of breath.    Cardiovascular: Negative.  Negative for chest pain and palpitations.   Gastrointestinal: Negative.  Negative for abdominal pain and vomiting.   Genitourinary: Negative.  Negative for dysuria and hematuria.   Musculoskeletal: Negative.  Negative for arthralgias and back pain.   Skin: Negative.  Negative for color change and rash.   Neurological: Negative.  Negative for seizures and syncope.   Psychiatric/Behavioral: Negative.     All other systems reviewed and are negative.      Objective   /82 (BP Location: Left arm, Patient Position: Sitting, Cuff Size: Large)   Pulse 72   Temp 97.6 °F (36.4 °C) (Tympanic)   Ht 5' 8\" (1.727 m)   Wt 118 kg (260 lb 9.6 oz)   SpO2 96%   BMI 39.62 kg/m²      Physical Exam  Vitals and nursing note reviewed.   Constitutional:       Appearance: Normal appearance.   HENT:      Head: Normocephalic.     Eyes:      Pupils: Pupils are equal, round, and reactive to light.       Cardiovascular:      Rate and Rhythm: Normal rate and regular rhythm.      Pulses: Normal pulses.      Heart sounds: Normal heart sounds.   Pulmonary:      Effort: Pulmonary effort " is normal. No respiratory distress.     Musculoskeletal:      Cervical back: Neck supple.     Neurological:      General: No focal deficit present.      Mental Status: He is alert and oriented to person, place, and time.     Psychiatric:         Mood and Affect: Mood normal.         Behavior: Behavior normal.